# Patient Record
Sex: MALE | Race: BLACK OR AFRICAN AMERICAN | Employment: OTHER | URBAN - METROPOLITAN AREA
[De-identification: names, ages, dates, MRNs, and addresses within clinical notes are randomized per-mention and may not be internally consistent; named-entity substitution may affect disease eponyms.]

---

## 2017-08-21 ENCOUNTER — HOSPITAL ENCOUNTER (OUTPATIENT)
Age: 82
Setting detail: OUTPATIENT SURGERY
Discharge: HOME | End: 2017-08-21
Attending: INTERNAL MEDICINE | Admitting: INTERNAL MEDICINE
Payer: MEDICARE

## 2017-08-21 ENCOUNTER — ANESTHESIA EVENT (OUTPATIENT)
Dept: ENDOSCOPY | Age: 82
End: 2017-08-21
Payer: MEDICARE

## 2017-08-21 ENCOUNTER — ANESTHESIA (OUTPATIENT)
Dept: ENDOSCOPY | Age: 82
End: 2017-08-21
Payer: MEDICARE

## 2017-08-21 VITALS
HEART RATE: 61 BPM | RESPIRATION RATE: 17 BRPM | OXYGEN SATURATION: 100 % | DIASTOLIC BLOOD PRESSURE: 75 MMHG | WEIGHT: 252 LBS | BODY MASS INDEX: 33.4 KG/M2 | SYSTOLIC BLOOD PRESSURE: 160 MMHG | HEIGHT: 73 IN

## 2017-08-21 LAB
GLUCOSE BLD STRIP.AUTO-MCNC: 106 MG/DL (ref 65–100)
GLUCOSE BLD STRIP.AUTO-MCNC: 59 MG/DL (ref 65–100)
GLUCOSE BLD STRIP.AUTO-MCNC: 71 MG/DL (ref 65–100)
GLUCOSE BLD STRIP.AUTO-MCNC: 73 MG/DL (ref 65–100)
GLUCOSE BLD STRIP.AUTO-MCNC: 87 MG/DL (ref 65–100)
SERVICE CMNT-IMP: ABNORMAL
SERVICE CMNT-IMP: ABNORMAL
SERVICE CMNT-IMP: NORMAL

## 2017-08-21 PROCEDURE — 76040000007: Performed by: INTERNAL MEDICINE

## 2017-08-21 PROCEDURE — 82962 GLUCOSE BLOOD TEST: CPT

## 2017-08-21 PROCEDURE — 74011250636 HC RX REV CODE- 250/636: Performed by: INTERNAL MEDICINE

## 2017-08-21 PROCEDURE — 88305 TISSUE EXAM BY PATHOLOGIST: CPT | Performed by: INTERNAL MEDICINE

## 2017-08-21 PROCEDURE — 74011250636 HC RX REV CODE- 250/636

## 2017-08-21 PROCEDURE — 77030009426 HC FCPS BIOP ENDOSC BSC -B: Performed by: INTERNAL MEDICINE

## 2017-08-21 PROCEDURE — 74011000250 HC RX REV CODE- 250: Performed by: INTERNAL MEDICINE

## 2017-08-21 PROCEDURE — 77030011640 HC PAD GRND REM COVD -A: Performed by: INTERNAL MEDICINE

## 2017-08-21 PROCEDURE — 76060000032 HC ANESTHESIA 0.5 TO 1 HR: Performed by: INTERNAL MEDICINE

## 2017-08-21 PROCEDURE — 77030003657 HC NDL SCLER BSC -B: Performed by: INTERNAL MEDICINE

## 2017-08-21 PROCEDURE — 77030013992 HC SNR POLYP ENDOSC BSC -B: Performed by: INTERNAL MEDICINE

## 2017-08-21 PROCEDURE — 74011000250 HC RX REV CODE- 250

## 2017-08-21 RX ORDER — DEXTROSE 50 % IN WATER (D50W) INTRAVENOUS SYRINGE
10 ONCE
Status: DISCONTINUED | OUTPATIENT
Start: 2017-08-21 | End: 2017-08-21 | Stop reason: HOSPADM

## 2017-08-21 RX ORDER — FLUMAZENIL 0.1 MG/ML
0.2 INJECTION INTRAVENOUS
Status: DISCONTINUED | OUTPATIENT
Start: 2017-08-21 | End: 2017-08-21 | Stop reason: HOSPADM

## 2017-08-21 RX ORDER — ATROPINE SULFATE 0.1 MG/ML
0.5 INJECTION INTRAVENOUS
Status: DISCONTINUED | OUTPATIENT
Start: 2017-08-21 | End: 2017-08-21 | Stop reason: HOSPADM

## 2017-08-21 RX ORDER — SODIUM CHLORIDE 9 MG/ML
50 INJECTION, SOLUTION INTRAVENOUS CONTINUOUS
Status: DISCONTINUED | OUTPATIENT
Start: 2017-08-21 | End: 2017-08-21 | Stop reason: HOSPADM

## 2017-08-21 RX ORDER — NALOXONE HYDROCHLORIDE 0.4 MG/ML
0.4 INJECTION, SOLUTION INTRAMUSCULAR; INTRAVENOUS; SUBCUTANEOUS
Status: DISCONTINUED | OUTPATIENT
Start: 2017-08-21 | End: 2017-08-21 | Stop reason: HOSPADM

## 2017-08-21 RX ORDER — FENTANYL CITRATE 50 UG/ML
100 INJECTION, SOLUTION INTRAMUSCULAR; INTRAVENOUS
Status: DISCONTINUED | OUTPATIENT
Start: 2017-08-21 | End: 2017-08-21 | Stop reason: HOSPADM

## 2017-08-21 RX ORDER — PROPOFOL 10 MG/ML
INJECTION, EMULSION INTRAVENOUS
Status: DISCONTINUED | OUTPATIENT
Start: 2017-08-21 | End: 2017-08-21 | Stop reason: HOSPADM

## 2017-08-21 RX ORDER — DEXTROSE 50 % IN WATER (D50W) INTRAVENOUS SYRINGE
10 AS NEEDED
Status: DISCONTINUED | OUTPATIENT
Start: 2017-08-21 | End: 2017-08-21 | Stop reason: HOSPADM

## 2017-08-21 RX ORDER — DEXTROMETHORPHAN/PSEUDOEPHED 2.5-7.5/.8
1.2 DROPS ORAL
Status: DISCONTINUED | OUTPATIENT
Start: 2017-08-21 | End: 2017-08-21 | Stop reason: HOSPADM

## 2017-08-21 RX ORDER — EPINEPHRINE 0.1 MG/ML
1 INJECTION INTRACARDIAC; INTRAVENOUS
Status: DISCONTINUED | OUTPATIENT
Start: 2017-08-21 | End: 2017-08-21 | Stop reason: HOSPADM

## 2017-08-21 RX ORDER — MIDAZOLAM HYDROCHLORIDE 1 MG/ML
.25-5 INJECTION, SOLUTION INTRAMUSCULAR; INTRAVENOUS
Status: DISCONTINUED | OUTPATIENT
Start: 2017-08-21 | End: 2017-08-21 | Stop reason: HOSPADM

## 2017-08-21 RX ORDER — PROPOFOL 10 MG/ML
INJECTION, EMULSION INTRAVENOUS AS NEEDED
Status: DISCONTINUED | OUTPATIENT
Start: 2017-08-21 | End: 2017-08-21 | Stop reason: HOSPADM

## 2017-08-21 RX ORDER — DEXTROSE 50 % IN WATER (D50W) INTRAVENOUS SYRINGE
Status: COMPLETED
Start: 2017-08-21 | End: 2017-08-21

## 2017-08-21 RX ORDER — GLUCOSAMINE SULFATE 1500 MG
POWDER IN PACKET (EA) ORAL DAILY
COMMUNITY

## 2017-08-21 RX ADMIN — DEXTROSE MONOHYDRATE 25 G: 25 INJECTION, SOLUTION INTRAVENOUS at 09:05

## 2017-08-21 RX ADMIN — PROPOFOL 125 MCG/KG/MIN: 10 INJECTION, EMULSION INTRAVENOUS at 09:36

## 2017-08-21 RX ADMIN — DEXTROSE MONOHYDRATE: 25 INJECTION, SOLUTION INTRAVENOUS at 09:20

## 2017-08-21 RX ADMIN — SODIUM CHLORIDE 50 ML/HR: 900 INJECTION, SOLUTION INTRAVENOUS at 09:00

## 2017-08-21 RX ADMIN — PROPOFOL 70 MG: 10 INJECTION, EMULSION INTRAVENOUS at 09:36

## 2017-08-21 NOTE — PROGRESS NOTES
Garret Shepard  1935  [id number]    Situation:  Verbal report received from: NELLIE Seo rn  Procedure: Procedure(s):  COLONOSCOPY  ENDOSCOPIC POLYPECTOMY  INJECTION    Background:    Preoperative diagnosis: SCREENING  Postoperative diagnosis: Polyp removal    :  Dr. Lennox Catalan  Assistant(s): Endoscopy Technician-1: Yadira Rosario RN-1: Mora Bartholomew RN    Specimens:   ID Type Source Tests Collected by Time Destination   1 : polyp Preservative Colon, Ascending  Jose Mcdermott MD 8/21/2017 0286 Pathology   2 : polyp  Preservative   Jose Mcdermott MD 8/21/2017 1013 Pathology     H. Pylori  no    Assessment:  Intra-procedure medications   Anesthesia gave intra-procedure sedation and medications, see anesthesia flow sheet yes    Intravenous fluids: NS@ KVO     Vital signs stable     Abdominal assessment: round and soft     Recommendation:  Discharge patient per MD order. Family  Permission to share finding with family or friend yes  Endoscopy discharge instructions have been reviewed and given to patient. The patient verbalized understanding and acceptance of instructions.

## 2017-08-21 NOTE — PROCEDURES
[address] MD Rafy  [phone]      2017    Colonoscopy Procedure Note  Tran Waldrop  :  1935  [facility] Medical Record Number: [id number]    Indications:     Screening colonoscopy  PCP:  Erlin Toledo MD  Anesthesia/Sedation: Conscious Sedation/Moderate Sedation  Endoscopist:  Dr. Cricket Martinez  Complications:  None  Estimate Blood Loss:  None    Permit:  The indications, risks, benefits and alternatives were reviewed with the patient or their decision maker who was provided an opportunity to ask questions and all questions were answered. The specific risks of colonoscopy with conscious sedation were reviewed, including but not limited to anesthetic complication, bleeding, adverse drug reaction, missed lesion, infection, IV site reactions, and intestinal perforation which would lead to the need for surgical repair. Alternatives to colonoscopy including radiographic imaging, observation without testing, or laboratory testing were reviewed including the limitations of those alternatives. After considering the options and having all their questions answered, the patient or their decision maker provided both verbal and written consent to proceed. Procedure in Detail:  After obtaining informed consent, positioning of the patient in the left lateral decubitus position, and conduction of a pre-procedure pause or \"time out\" the endoscope was introduced into the anus and advanced to the surgical anastomosis which is distal ascending colon to hepatic flexure. The quality of the colonic preparation was inadequate to exclude polyps. A careful inspection was made as the colonoscope was withdrawn, findings and interventions are described below.     Anastomosis photographed    Findings:   Immediately distal to anastomosis is 30 mm sessile polyp  Two additional polyps in mid transverse  Unable for reasonable evaluation rectum, sigmoid, descending because of preparation limitations    Specimens:    30 mm polyp removed caustery snare after 60 cc saline injection for saline pillow; 10 cc [place] ink also injected   Two mid transverse polyps removed cold snare    Complications:   None; patient tolerated the procedure well. Estimated blood loss: none    Impression:  colon polyps    Recommendations:      - if no cancer present follow up exam six months    Thank you for entrusting me with this patient's care. Please do not hesitate to contact me with any questions or if I can be of assistance with any of your other patients' GI needs.     Signed By: Piper Cobian MD                        August 21, 2017

## 2017-08-21 NOTE — PROGRESS NOTES
1103  Blood sugar retested 80  Dr Conrad Severs anestheologist notified of result  Approved for discharge pt given and eating zuri crackers and peanut butter and cranberry juice  Pt alert and oriented  Pleasant and cooperative  No distress noted

## 2017-08-21 NOTE — PERIOP NOTES
Pt resting comfortably on stretcher HOB up A&O x3 wife at bedside, BS 61 Dr Venkat Elmore notified will continue to monitor pt.

## 2017-08-21 NOTE — H&P
Gastroenterology Outpatient History and Physical    Patient: Sánchez Amaya    Physician: Ronald Linares MD    Vital Signs: See RN notes    Allergies: No Known Allergies    Chief Complaint: follow up    History of Present Illness: 2014 had five colon polyps including sessile 25 mm lesion. No chest pain, SOB, edema, focal weakness, numbness    Justification for Procedure: mulitple large polyps    History:  Past Medical History:   Diagnosis Date    Diabetes ([address]     Hypertension       Past Surgical History:   Procedure Laterality Date    ABDOMEN SURGERY PROC UNLISTED      partial colectomy (Dr. Galen Carvalho)      Social History     Social History    Marital status:      Spouse name: N/A    Number of children: N/A    Years of education: N/A     Social History Main Topics    Smoking status: Not on file    Smokeless tobacco: Not on file    Alcohol use No    Drug use: Not on file    Sexual activity: Not on file     Other Topics Concern    Not on file     Social History Narrative    No narrative on file      Family History   Problem Relation Age of Onset    Malignant Hyperthermia Neg Hx     Pseudocholinesterase Deficiency Neg Hx     Delayed Awakening Neg Hx     Post-op Nausea/Vomiting Neg Hx     Emergence Delirium Neg Hx     Post-op Cognitive Dysfunction Neg Hx     Other Neg Hx        Medications:   Prior to Admission medications    Medication Sig Start Date End Date Taking? Authorizing Provider   aspirin delayed-release 81 mg tablet Take 81 mg by mouth daily. Historical Provider   metFORMIN (GLUCOPHAGE) 500 mg tablet Take 500 mg by mouth two (2) times daily (with meals). Historical Provider   atenolol (TENORMIN) 100 mg tablet Take 100 mg by mouth daily. Historical Provider   losartan-hydrochlorothiazide (HYZAAR) 100-12.5 mg per tablet Take 1 Tab by mouth daily. Historical Provider   furosemide (LASIX) 20 mg tablet Take 40 mg by mouth daily.     Historical Provider   simvastatin (ZOCOR) 20 mg tablet Take 10 mg by mouth nightly. Historical Provider   insulin (HUMULIN 70/30) 100 unit/mL (70-30) injection by SubCUTAneous route once. Indications: TYPE 1 DIABETES MELLITUS    Historical Provider   timolol maleate 0.5 % DrpD ophthalmic solution Administer 1 Drop to both eyes daily. Historical Provider       Physical Exam:   General: alert, cooperative, no distress   HEENT: Head: Normocephalic, no lesions, without obvious abnormality. Heart: regular rate and rhythm   Lungs: chest clear, no wheezing, rales, normal symmetric air entry   Abdominal: Bowel sounds are normal, liver is not enlarged, spleen is not enlarged           Findings/Diagnosis: personal history cancer, polyps    Plan of Care/Planned Procedure: I've discussed colonoscopy possible biopsy, polypectomy, cautery, injection, alternatives, complications including but not limited to pain, cardiopulmonary event, bleeding, perforation requiring additional blood transfusion or operative repair; all questions answered.     Signed By: Yissel Guerra MD     August 21, 2017

## 2017-08-21 NOTE — IP AVS SNAPSHOT
[address] 
[phone] Patient: Laureano Christian MRN: [id number] :1935 You are allergic to the following No active allergies Recent Documentation Height Weight BMI Smoking Status 1.854 m 114.3 kg 33.25 kg/m2 Former Smoker Emergency Contacts Name Discharge Info Relation Home Work Mobile FaniShyamAdelita DISCHARGE CAREGIVER [3] Spouse [3] [phone] About your hospitalization You were admitted on:  2017 You last received care in the:  [facility] ENDOSCOPY You were discharged on:  2017 Unit phone number:  [phone] Why you were hospitalized Your primary diagnosis was:  Not on File Providers Seen During Your Hospitalizations Provider Role Specialty Primary office phone Shahbaz Restrepo MD Attending Provider Gastroenterology [phone] Your Primary Care Physician (PCP) Primary Care Physician Office Phone Office Fax [place] [phone] Follow-up Information Follow up With Details Comments Contact Info Jacky Vela MD   [facility] [place] [address] 
[phone] Current Discharge Medication List  
  
CONTINUE these medications which have NOT CHANGED Dose & Instructions Dispensing Information Comments Morning Noon Evening Bedtime  
 aspirin delayed-release 81 mg tablet Your last dose was: Your next dose is:    
   
   
 Dose:  81 mg Take 81 mg by mouth daily. Refills:  0  
     
   
   
   
  
 atenolol 100 mg tablet Commonly known as:  TENORMIN Your last dose was: Your next dose is:    
   
   
 Dose:  100 mg Take 100 mg by mouth daily. Refills:  0 HumuLIN 70/30 100 unit/mL (70-30) injection Generic drug:  insulin NPH/insulin regular Your last dose was: Your next dose is:    
   
   
 by SubCUTAneous route once. Indications: TYPE 1 DIABETES MELLITUS Refills:  0  
     
   
   
   
  
 losartan-hydroCHLOROthiazide 100-12.5 mg per tablet Commonly known as:  HYZAAR Your last dose was: Your next dose is:    
   
   
 Dose:  1 Tab Take 1 Tab by mouth daily. Refills:  0  
     
   
   
   
  
 metFORMIN 500 mg tablet Commonly known as:  GLUCOPHAGE Your last dose was: Your next dose is:    
   
   
 Dose:  500 mg Take 500 mg by mouth two (2) times daily (with meals). Refills:  0  
     
   
   
   
  
 timolol maleate 0.5 % Drpd ophthalmic solution Your last dose was: Your next dose is:    
   
   
 Dose:  1 Drop Administer 1 Drop to both eyes daily. Refills:  0  
     
   
   
   
  
 VITAMIN D3 1,000 unit Cap Generic drug:  cholecalciferol Your last dose was: Your next dose is: Take  by mouth daily. Refills:  0 Discharge Instructions [place] [id number] 1935 DISCHARGE INSTRUCTIONS Discomfort: 
Redness at IV site- apply warm compress to area; if redness or soreness persist- contact your physician. There may be a slight amount of blood passed from the rectum. Gaseous discomfort - walking, belching will help relieve any discomfort. You may not operate a vehicle for 12 hours. You may not engage in an occupation involving machinery or appliances for rest of today. You may not drink alcoholic beverages for at least 12 hours. Avoid making any critical decisions for at least 24 hours. DIET: 
 High fiber diet.  however -  remember your colon is empty and a heavy meal will produce gas. Avoid these foods:  vegetables, fried / greasy foods, carbonated drinks for today.  
 
  
ACTIVITY: 
You may resume your normal daily activities it is recommended that you spend the remainder of the day resting -  avoid any strenuous activity. CALL M.D. ANY SIGN OF: Increasing pain, nausea, vomiting Abdominal distension (swelling) New increased bleeding (oral or rectal) Fever (chills) Pain in chest area Bloody discharge from nose or mouth Shortness of breath Follow-up Instructions: 
Call Dr. Hussein Leone in six months DISCHARGE SUMMARY from Nurse The following personal items collected during your admission are returned to you:  
Dental Appliance: Dental Appliances: None Vision: Visual Aid: Glasses Hearing Aid:   
Jewelry:   
Clothing:   
Other Valuables:   
Valuables sent to safe:   
 
 
Discharge Orders None Introducing [facility]! Jin Blanco introduces [employer] patient portal. Now you can access parts of your medical record, email your doctor's office, and request medication refills online. 1. In your internet browser, go to [email or web address] 2. Click on the First Time User? Click Here link in the Sign In box. You will see the New Member Sign Up page. 3. Enter your [employer] Access Code exactly as it appears below. You will not need to use this code after youve completed the sign-up process. If you do not sign up before the expiration date, you must request a new code. · [employer] Access Code: [email or web address] Expires: 11/19/2017  8:00 AM 
 
4. Enter the last four digits of your Social Security Number (xxxx) and Date of Birth (mm/dd/yyyy) as indicated and click Submit. You will be taken to the next sign-up page. 5. Create a [employer] ID. This will be your [employer] login ID and cannot be changed, so think of one that is secure and easy to remember. 6. Create a [employer] password. You can change your password at any time. 7. Enter your Password Reset Question and Answer. This can be used at a later time if you forget your password. 8. Enter your e-mail address.  You will receive e-mail notification when new information is available in [employer]. 9. Click Sign Up. You can now view and download portions of your medical record. 10. Click the Download Summary menu link to download a portable copy of your medical information. If you have questions, please visit the Frequently Asked Questions section of the [employer] website. Remember, [employer] is NOT to be used for urgent needs. For medical emergencies, dial 911. Now available from your iPhone and Android! General Information Please provide this summary of care documentation to your next provider. Patient Signature:  ____________________________________________________________ Date:  ____________________________________________________________  
  
[facility] Provider Signature:  ____________________________________________________________ Date:  ____________________________________________________________

## 2017-08-21 NOTE — DISCHARGE INSTRUCTIONS
Nadira Mook  [id number]  1935    DISCHARGE INSTRUCTIONS  Discomfort:  Redness at IV site- apply warm compress to area; if redness or soreness persist- contact your physician. There may be a slight amount of blood passed from the rectum. Gaseous discomfort - walking, belching will help relieve any discomfort. You may not operate a vehicle for 12 hours. You may not engage in an occupation involving machinery or appliances for rest of today. You may not drink alcoholic beverages for at least 12 hours. Avoid making any critical decisions for at least 24 hours. DIET:   High fiber diet. - however -  remember your colon is empty and a heavy meal will produce gas. Avoid these foods:  vegetables, fried / greasy foods, carbonated drinks for today. ACTIVITY:  You may resume your normal daily activities it is recommended that you spend the remainder of the day resting -  avoid any strenuous activity. CALL M.D.   ANY SIGN OF:   Increasing pain, nausea, vomiting  Abdominal distension (swelling)  New increased bleeding (oral or rectal)  Fever (chills)  Pain in chest area  Bloody discharge from nose or mouth  Shortness of breath     Follow-up Instructions:  Call Dr. Hussein Leone in six months      DISCHARGE SUMMARY from Nurse    The following personal items collected during your admission are returned to you:   Dental Appliance: Dental Appliances: None  Vision: Visual Aid: Glasses  Hearing Aid:    Jewelry:    Clothing:    Other Valuables:    Valuables sent to safe:

## 2017-08-21 NOTE — PERIOP NOTES
Pt BS 73 at this time, Dr Andrew Franklin at bedside completed colonoscopy/procedure will re-check BS in recovery.

## 2017-08-21 NOTE — ANESTHESIA POSTPROCEDURE EVALUATION
Post-Anesthesia Evaluation and Assessment    Patient: Dave Osborne MRN: [id number]  SSN: [id number]    YOB: 1935  Age: 81 y.o. Sex: male       Cardiovascular Function/Vital Signs  Visit Vitals    /71    Pulse 64    Resp 18    Ht 6' 1\" (1.854 m)    Wt 114.3 kg (252 lb)    SpO2 100%    BMI 33.25 kg/m2       Patient is status post MAC anesthesia for Procedure(s):  COLONOSCOPY  ENDOSCOPIC POLYPECTOMY  INJECTION. Nausea/Vomiting: None    Postoperative hydration reviewed and adequate. Pain:  Pain Scale 1: Numeric (0 - 10) (08/21/17 1034)  Pain Intensity 1: 0 (08/21/17 1034)   Managed    Neurological Status: At baseline    Mental Status and Level of Consciousness: Arousable    Pulmonary Status:   O2 Device: Room air (08/21/17 1034)   Adequate oxygenation and airway patent    Complications related to anesthesia: None    Post-anesthesia assessment completed.  No concerns    Signed By: Raquel Everett MD     August 21, 2017

## 2017-08-21 NOTE — ANESTHESIA PREPROCEDURE EVALUATION
Anesthetic History   No history of anesthetic complications            Review of Systems / Medical History  Patient summary reviewed    Pulmonary  Within defined limits                 Neuro/Psych   Within defined limits           Cardiovascular    Hypertension: well controlled              Exercise tolerance: >4 METS     GI/Hepatic/Renal  Within defined limits              Endo/Other    Diabetes: well controlled, type 2, using insulin    Obesity     Other Findings              Physical Exam    Airway  Mallampati: II  TM Distance: 4 - 6 cm  Neck ROM: normal range of motion   Mouth opening: Normal     Cardiovascular    Rhythm: regular  Rate: normal         Dental    Dentition: Full lower dentures and Full upper dentures     Pulmonary  Breath sounds clear to auscultation               Abdominal         Other Findings            Anesthetic Plan    ASA: 3  Anesthesia type: MAC            Anesthetic plan and risks discussed with: Patient

## 2017-08-23 LAB
GLUCOSE BLD STRIP.AUTO-MCNC: NORMAL MG/DL (ref 65–100)
SERVICE CMNT-IMP: NORMAL

## 2017-12-27 ENCOUNTER — ED HISTORICAL/CONVERTED ENCOUNTER (OUTPATIENT)
Dept: OTHER | Age: 82
End: 2017-12-27

## 2019-10-05 ENCOUNTER — OP HISTORICAL/CONVERTED ENCOUNTER (OUTPATIENT)
Dept: OTHER | Age: 84
End: 2019-10-05

## 2019-10-13 ENCOUNTER — ED HISTORICAL/CONVERTED ENCOUNTER (OUTPATIENT)
Dept: OTHER | Age: 84
End: 2019-10-13

## 2022-03-25 ENCOUNTER — HOSPITAL ENCOUNTER (EMERGENCY)
Age: 87
Discharge: HOME | End: 2022-03-25
Attending: EMERGENCY MEDICINE | Admitting: EMERGENCY MEDICINE
Payer: MEDICARE

## 2022-03-25 VITALS
DIASTOLIC BLOOD PRESSURE: 85 MMHG | WEIGHT: 245 LBS | SYSTOLIC BLOOD PRESSURE: 172 MMHG | RESPIRATION RATE: 18 BRPM | HEIGHT: 73 IN | TEMPERATURE: 99.5 F | OXYGEN SATURATION: 99 % | BODY MASS INDEX: 32.47 KG/M2 | HEART RATE: 114 BPM

## 2022-03-25 DIAGNOSIS — K40.90 UNILATERAL INGUINAL HERNIA WITHOUT OBSTRUCTION OR GANGRENE, RECURRENCE NOT SPECIFIED: Primary | ICD-10-CM

## 2022-03-25 PROCEDURE — 99282 EMERGENCY DEPT VISIT SF MDM: CPT

## 2022-03-25 NOTE — ED PROVIDER NOTES
Understanding Generalized Anxiety Disorder (ROSE MARY)  Anxiety can fill you with worry and fear. Sometimes anxiety is healthy. It alerts you to a potential threat and prompts you to respond and take action. But, for some people, anxiety gets so bad it causes problems in daily life. If you find yourself in a constant state of anxiety, you may have an anxiety disorder called generalized anxiety disorder (ROSE MARY). Speak with your doctor or mental health professional to learn more. He or she can help.     What is generalized anxiety disorder?  With ROSE MARY, you might worry about money, your family and friends, work, or the world in general. You might not even be sure what you're anxious about. Whatever it is, though, you have an intense fear that the worst will happen. These feelings never really go away. This constant worry affects your quality of life and makes it hard to function. ROSE MARY can cause physical symptoms, too.  What are common symptoms of generalized anxiety disorder?  People with ROSE MARY often think they have a physical illness. The disorder can cause symptoms, such as:    Muscle tension, especially in the neck and shoulders.    Nausea and stomach problems.    Frequent headaches.    Feeling lightheaded.    Restlessness, trouble sleeping.    Feeling irritable and on edge all the time.  How can generalized anxiety disorder be treated?  ROSE MARY can be treated with medicine or therapy, or both. Medicine helps to reduce symptoms, so you can continue with your daily routine. Therapy helps you understand the cause of your anxiety and learn how to manage it. Both forms of treatment help you deal with obstacles that anxiety causes in your life, so you can be healthier and happier.    7155-1597 [employer]. [address] [place] [address], [place] [address]. All rights reserved. This information is not intended as a substitute for professional medical care. Always follow your healthcare professional's instructions.         EMERGENCY DEPARTMENT HISTORY AND PHYSICAL EXAM      Date: 3/25/2022  Patient Name: Josesito De La Cruz    History of Presenting Illness     Chief Complaint   Patient presents with    Groin Pain    Back Pain       History Provided By: Patient    HPI: Josesito De La Cruz, 81 y.o. male with a past medical history significant No significant past medical history presents to the ED with cc of left testicular pain going on for the past 3 to 4 days. Patient says that he was lifting a heavy gas can and felt a strain in his lower groin. He denies any dysuria urgency or frequency. Says the pain is only made worse with walking. Patient denies any fever, chills, nausea, vomiting, chest pain, shortness of breath, rash, diarrhea, headache, night sweats. There are no other complaints, changes, or physical findings at this time. PCP: Rafita Sy MD    No current facility-administered medications on file prior to encounter. Current Outpatient Medications on File Prior to Encounter   Medication Sig Dispense Refill    cholecalciferol (VITAMIN D3) 1,000 unit cap Take  by mouth daily.  aspirin delayed-release 81 mg tablet Take 81 mg by mouth daily.  metFORMIN (GLUCOPHAGE) 500 mg tablet Take 500 mg by mouth two (2) times daily (with meals).  atenolol (TENORMIN) 100 mg tablet Take 100 mg by mouth daily.  losartan-hydrochlorothiazide (HYZAAR) 100-12.5 mg per tablet Take 1 Tab by mouth daily.  insulin (HUMULIN 70/30) 100 unit/mL (70-30) injection by SubCUTAneous route once. Indications: TYPE 1 DIABETES MELLITUS      timolol maleate 0.5 % DrpD ophthalmic solution Administer 1 Drop to both eyes daily.            Past History     Past Medical History:  Past Medical History:   Diagnosis Date    Diabetes ([address]     Hypertension        Past Surgical History:  Past Surgical History:   Procedure Laterality Date    COLONOSCOPY N/A 8/21/2017    COLONOSCOPY performed by Kalina Quick MD at [address] [place] [address]  CO ABDOMEN SURGERY PROC UNLISTED      partial colectomy (Dr. Stu Walker)       Family History:  Family History   Problem Relation Age of Onset    Malignant Hyperthermia Neg Hx     Pseudocholinesterase Deficiency Neg Hx     Delayed Awakening Neg Hx     Post-op Nausea/Vomiting Neg Hx     Emergence Delirium Neg Hx     Post-op Cognitive Dysfunction Neg Hx     Other Neg Hx        Social History:  Social History     Tobacco Use    Smoking status: Former Smoker    Smokeless tobacco: Never Used   Substance Use Topics    Alcohol use: No    Drug use: No       Allergies:  No Known Allergies        Review of Systems   Constitutional: Negative. Negative for appetite change, chills, fatigue and fever. HENT: Negative. Negative for congestion and sinus pain. Eyes: Negative. Negative for pain and visual disturbance. Respiratory: Negative. Negative for chest tightness and shortness of breath. Cardiovascular: Negative. Negative for chest pain. Gastrointestinal: Negative. Negative for abdominal pain, diarrhea, nausea and vomiting. Genitourinary: Positive for testicular pain. Negative for difficulty urinating, dysuria and genital sores. No discharge   Musculoskeletal: Negative. Negative for arthralgias. Skin: Negative. Negative for rash. Neurological: Negative. Negative for weakness and headaches. Hematological: Negative. Psychiatric/Behavioral: Negative. Negative for agitation. The patient is not nervous/anxious. All other systems reviewed and are negative. Physical Exam     Physical Exam  Vitals and nursing note reviewed. Constitutional:       General: He is not in acute distress. Appearance: He is well-developed. HENT:      Head: Normocephalic and atraumatic. Nose: Nose normal.      Mouth/Throat:      Mouth: Mucous membranes are moist.      Pharynx: Oropharynx is clear. No oropharyngeal exudate. Eyes:      General:         Right eye: No discharge. Left eye: No discharge. Conjunctiva/sclera: Conjunctivae normal.      Pupils: Pupils are equal, round, and reactive to light. Cardiovascular:      Rate and Rhythm: Normal rate and regular rhythm. Chest Wall: PMI is not displaced. No thrill. Heart sounds: Normal heart sounds. No murmur heard. No friction rub. No gallop. Pulmonary:      Effort: Pulmonary effort is normal. No respiratory distress. Breath sounds: Normal breath sounds. No wheezing or rales. Chest:      Chest wall: No tenderness. Abdominal:      General: Bowel sounds are normal. There is no distension. Palpations: Abdomen is soft. There is no mass. Tenderness: There is no abdominal tenderness. There is no guarding or rebound. Genitourinary:     Comments: Reducible indirect left-sided inguinal hernia  Musculoskeletal:         General: Normal range of motion. Cervical back: Normal range of motion and neck supple. Lymphadenopathy:      Cervical: No cervical adenopathy. Skin:     General: Skin is warm and dry. Capillary Refill: Capillary refill takes less than 2 seconds. Findings: No erythema or rash. Neurological:      Mental Status: He is alert and oriented to person, place, and time. Cranial Nerves: No cranial nerve deficit. Coordination: Coordination normal.   Psychiatric:         Mood and Affect: Mood normal.         Behavior: Behavior normal.         Lab and Diagnostic Study Results     Labs -   No results found for this or any previous visit (from the past 12 hour(s)). Radiologic Studies -   @lastxrresult@  CT Results  (Last 48 hours)    None        CXR Results  (Last 48 hours)    None            Medical Decision Making   - I am the first provider for this patient. - I reviewed the vital signs, available nursing notes, past medical history, past surgical history, family history and social history. - Initial assessment performed.  The patients presenting problems have been discussed, and they are in agreement with the care plan formulated and outlined with them. I have encouraged them to ask questions as they arise throughout their visit. Vital Signs-Reviewed the patient's vital signs. Patient Vitals for the past 12 hrs:   Temp Pulse Resp BP SpO2   03/25/22 0920 99.5 °F (37.5 °C) (!) 114 18 (!) 172/85 99 %         ED Course:          Provider Notes (Medical Decision Making):   81-year-old male with unilateral indirect inguinal hernia that is reducible. Patient presents stating that happened after heavy lifting. We will have him follow-up with general surgery for operative consultation. MDM       Procedures   Medical Decision Makingedical Decision Making  Performed by: Dhiraj Arreola MD  PROCEDURES:  Procedures       Disposition   Disposition: Condition stable    Discharged    DISCHARGE PLAN:  1. Current Discharge Medication List      CONTINUE these medications which have NOT CHANGED    Details   cholecalciferol (VITAMIN D3) 1,000 unit cap Take  by mouth daily. aspirin delayed-release 81 mg tablet Take 81 mg by mouth daily. metFORMIN (GLUCOPHAGE) 500 mg tablet Take 500 mg by mouth two (2) times daily (with meals). atenolol (TENORMIN) 100 mg tablet Take 100 mg by mouth daily. losartan-hydrochlorothiazide (HYZAAR) 100-12.5 mg per tablet Take 1 Tab by mouth daily. insulin (HUMULIN 70/30) 100 unit/mL (70-30) injection by SubCUTAneous route once. Indications: TYPE 1 DIABETES MELLITUS      timolol maleate 0.5 % DrpD ophthalmic solution Administer 1 Drop to both eyes daily. 2.   Follow-up Information     Follow up With Specialties Details Why Contact Info    Joao Banks MD Colon and Rectal Surgery, General Surgery Call today  [phone] [employer]  [phone]          3. Return to ED if worse   4. Current Discharge Medication List            Diagnosis     Clinical Impression:   1.  Unilateral inguinal hernia without obstruction or gangrene, recurrence not specified        Attestations:    Dalia London MD    Please note that this dictation was completed with [employer], the computer voice recognition software. Quite often unanticipated grammatical, syntax, homophones, and other interpretive errors are inadvertently transcribed by the computer software. Please disregard these errors. Please excuse any errors that have escaped final proofreading. Thank you.

## 2022-03-25 NOTE — ED TRIAGE NOTES
Lower back and scrotum swelling for 2 days, after lifting more than 5lbs. Htn has not taken Rx for 2 day, and BG (300) elevated no Rx for 2 days.

## 2022-03-31 ENCOUNTER — OFFICE VISIT (OUTPATIENT)
Dept: SURGERY | Age: 87
End: 2022-03-31
Payer: MEDICARE

## 2022-03-31 VITALS
WEIGHT: 252.5 LBS | HEIGHT: 73 IN | BODY MASS INDEX: 33.46 KG/M2 | SYSTOLIC BLOOD PRESSURE: 168 MMHG | TEMPERATURE: 98.1 F | OXYGEN SATURATION: 96 % | RESPIRATION RATE: 20 BRPM | HEART RATE: 78 BPM | DIASTOLIC BLOOD PRESSURE: 88 MMHG

## 2022-03-31 DIAGNOSIS — K40.90 NON-RECURRENT UNILATERAL INGUINAL HERNIA WITHOUT OBSTRUCTION OR GANGRENE: Primary | ICD-10-CM

## 2022-03-31 PROCEDURE — G8427 DOCREV CUR MEDS BY ELIG CLIN: HCPCS | Performed by: SURGERY

## 2022-03-31 PROCEDURE — G8417 CALC BMI ABV UP PARAM F/U: HCPCS | Performed by: SURGERY

## 2022-03-31 PROCEDURE — 99204 OFFICE O/P NEW MOD 45 MIN: CPT | Performed by: SURGERY

## 2022-03-31 PROCEDURE — G8510 SCR DEP NEG, NO PLAN REQD: HCPCS | Performed by: SURGERY

## 2022-03-31 PROCEDURE — 1101F PT FALLS ASSESS-DOCD LE1/YR: CPT | Performed by: SURGERY

## 2022-03-31 PROCEDURE — G8536 NO DOC ELDER MAL SCRN: HCPCS | Performed by: SURGERY

## 2022-03-31 NOTE — PROGRESS NOTES
[address], [address] [facility], [address] [place] [address]  [phone]      Patient Name: Nadia Massey (25 y.o., male)    Patient Address: [address] [place] [id number]    PCP: Joselin Sheffield MD     Patient contact numbers:     Home Phone  Work Phone  Mobile [phone]       History of Present Illness  Patient is an 79-year-old female who presents to the office today for evaluation of left groin pain. He was evaluated in the emergency department a few days ago and was diagnosed with a reducible left indirect inguinal hernia and referred to my office for further evaluation. Patient states that the pain began a few days before his presentation in the emergency department and is described as an aching pain. He states that the pain is worse with movement including adduction of his lower extremities. He also states the pain is worse with ambulation and he is requiring a cane to assist with ambulation currently. He denies any difficulty with bowel movements or difficulty with voiding. He is tolerating a regular diet without nausea or vomiting. He denies any fevers or chills.     Past Medical History:   Diagnosis Date    Diabetes ([address]     Hypertension        Past Surgical History:   Procedure Laterality Date    COLONOSCOPY N/A 8/21/2017    COLONOSCOPY performed by Kelly Barrios MD at [address] [place] [address] UNLISTED      partial colectomy (Dr. Luis Carlos Mena)       Family History   Problem Relation Age of Onset    Malignant Hyperthermia Neg Hx     Pseudocholinesterase Deficiency Neg Hx     Delayed Awakening Neg Hx     Post-op Nausea/Vomiting Neg Hx     Emergence Delirium Neg Hx     Post-op Cognitive Dysfunction Neg Hx     Other Neg Hx        Social History     Tobacco Use    Smoking status: Former Smoker    Smokeless tobacco: Never Used   Substance Use Topics    Alcohol use: No    Drug use: No       No Known Allergies    Current Outpatient Medications   Medication Sig    metFORMIN (GLUCOPHAGE) 500 mg tablet Take 500 mg by mouth two (2) times daily (with meals).  atenolol (TENORMIN) 100 mg tablet Take 100 mg by mouth daily.  losartan-hydrochlorothiazide (HYZAAR) 100-12.5 mg per tablet Take 1 Tab by mouth daily.  insulin (HUMULIN 70/30) 100 unit/mL (70-30) injection by SubCUTAneous route once. Indications: TYPE 1 DIABETES MELLITUS    timolol maleate 0.5 % DrpD ophthalmic solution Administer 1 Drop to both eyes daily.  cholecalciferol (VITAMIN D3) 1,000 unit cap Take  by mouth daily.  aspirin delayed-release 81 mg tablet Take 81 mg by mouth daily. No current facility-administered medications for this visit. Review of Systems   Constitutional: Negative for chills, fever and weight loss. HENT: Negative for congestion, ear pain and sore throat. Eyes: Negative for blurred vision and redness. Respiratory: Negative for cough, shortness of breath and wheezing. Cardiovascular: Negative for chest pain, palpitations and leg swelling. Gastrointestinal: Negative for abdominal pain, nausea and vomiting. Genitourinary: Negative for dysuria, frequency and hematuria.        +groin pain, +scrotal/testicular pain   Musculoskeletal: Negative for joint pain and myalgias. Skin: Negative for itching and rash. Neurological: Negative for dizziness, seizures and headaches. Endo/Heme/Allergies: Does not bruise/bleed easily. Physical Exam  Visit Vitals  BP (!) 168/88 (BP 1 Location: Right upper arm, BP Patient Position: Sitting, BP Cuff Size: Large adult) Comment: pt jean has trouble keeping under control   Pulse 78   Temp 98.1 °F (36.7 °C) (Temporal)   Resp 20   Ht 6' 1\" (1.854 m)   Wt 252 lb 8 oz (114.5 kg)   SpO2 96%   BMI 33.31 kg/m²       Physical Exam  Constitutional:       General: He is not in acute distress. Appearance: Normal appearance. He is not ill-appearing.    HENT: Head: Normocephalic and atraumatic. Right Ear: External ear normal.      Left Ear: External ear normal.      Nose: Nose normal.      Mouth/Throat:      Mouth: Mucous membranes are moist.      Pharynx: Oropharynx is clear. Eyes:      Extraocular Movements: Extraocular movements intact. Pupils: Pupils are equal, round, and reactive to light. Cardiovascular:      Rate and Rhythm: Normal rate and regular rhythm. Pulses: Normal pulses. Heart sounds: Normal heart sounds. No murmur heard. Pulmonary:      Effort: Pulmonary effort is normal.      Breath sounds: Normal breath sounds. Abdominal:      General: There is no distension. Palpations: Abdomen is soft. Tenderness: There is no abdominal tenderness. There is no guarding. Hernia: A hernia is present. Hernia is present in the left inguinal area. Genitourinary:     Penis: Normal.       Testes:         Right: Tenderness or swelling not present. Left: Tenderness and swelling present. Comments: Left indirect inguinal hernia  Musculoskeletal:         General: No swelling or tenderness. Normal range of motion. Cervical back: Normal range of motion and neck supple. No rigidity or tenderness. Skin:     General: Skin is warm and dry. Neurological:      General: No focal deficit present. Mental Status: He is alert and oriented to person, place, and time. Psychiatric:         Mood and Affect: Mood normal.         Behavior: Behavior normal.          Assessment  Problem List Items Addressed This Visit     None      Visit Diagnoses     Non-recurrent unilateral inguinal hernia without obstruction or gangrene    -  Primary    Relevant Orders    CT ABD PELV WO CONT        Left indirect inguinal hernia, left hemiscrotum with mild swelling but very tender to palpation. Plan  - Recommend CT of the abdomen/pelvis to rule out other testicular process prior to proceeding with surgery.   - I did discuss the risks and benefits of minimally-invasive inguinal hernia repair, including bleeding, infection, damage to surrounding structures, need for further surgery, possible need to perform bilateral repair, etc.   - I will contact the patient after his CT scan to discuss results and possibly schedule for surgery. - All their questions were answered.       Kenney Felipe, DO

## 2022-03-31 NOTE — PROGRESS NOTES
Chief Complaint   Patient presents with    New Patient     pt says he has testicle swelling-very painful   There were no vitals taken for this visit. 1. Have you been to the ER, urgent care clinic since your last visit? Hospitalized since your last visit? yes    2. Have you seen or consulted any other health care providers outside of the [address] [place] [address] since your last visit? Include any pap smears or colon screening. No                                     First BP check 177/90,  Repeat bp check>168/88. Pt says his physicians changes his medication and has a hard time keeping it straight.

## 2022-04-04 ENCOUNTER — HOSPITAL ENCOUNTER (OUTPATIENT)
Dept: CT IMAGING | Age: 87
Discharge: HOME | End: 2022-04-04
Attending: SURGERY
Payer: MEDICARE

## 2022-04-04 DIAGNOSIS — K40.90 NON-RECURRENT UNILATERAL INGUINAL HERNIA WITHOUT OBSTRUCTION OR GANGRENE: ICD-10-CM

## 2022-04-04 PROCEDURE — 74176 CT ABD & PELVIS W/O CONTRAST: CPT

## 2022-04-06 DIAGNOSIS — N41.0 ACUTE PROSTATITIS: Primary | ICD-10-CM

## 2022-04-06 RX ORDER — CIPROFLOXACIN 500 MG/1
500 TABLET ORAL 2 TIMES DAILY
Qty: 20 TABLET | Refills: 0 | Status: SHIPPED | OUTPATIENT
Start: 2022-04-06 | End: 2022-04-16

## 2022-04-06 NOTE — PROGRESS NOTES
CT Results (most recent):  Results from Hospital Encounter encounter on 04/04/22    CT ABD PELV WO CONT    Narrative  CT ABDOMEN PELVIS    CLINICAL: Unilateral inguinal hernia without obstruction or gangrene. Not  specified as recurrent. COMPARISON:  None. .    TECHNIQUE: Axial imaging abdomen pelvis without IV contrast, multiplanar  reformatting. Oral contrast administered for intraluminal bowel opacification. Dose reduction: All CT scans at this facility are performed using dose reduction  optimization techniques as appropriate to a performed exam including the  following: Automated exposure control, adjustments of the mA and/or kV according  to patient's size, or use of iterative reconstruction technique. FINDINGS:  LUNG BASES: Clear. LIVER: Unremarkable for noncontrast technique. GALLBLADDER AND BILIARY TREE: No evident gallstones, gallbladder wall  thickening, or biliary ductal dilation. PANCREAS: Atrophic. 2.3 x 1.4 by 1.2 cm cyst pancreas tail. SPLEEN: Unremarkable for noncontrast technique. ADRENALS: Unremarkable. KIDNEYS AND URETERS: Symmetric renal size. No urinary stone or collecting system  dilation. BLADDER: Incompletely distended. Circumferential bladder wall thickening. REPRODUCTIVE ORGANS: Prostate enlarged 4.6 x 5.2 by approximately 3.5 cm, With  surrounding strandy inflammation. BOWEL: Oral contrast has achieved the sigmoid colon. The bowel is not dilated. Right ileocolic anastomotic suture line. LYMPH NODES:  No lymphadenopathy. PERITONEUM: No free air, ascites, walled off fluid collection. VESSELS: Abdominal aorta normal caliber. ABDOMINAL WALL: 5.0 cm ventral wall diastases at the umbilicus containing  nondilated bowel and noninflamed fat. No evident inguinal hernia. Anterior  superficial abdominal wall soft tissue thickening. BONES: Degenerative changes about the bony structures. In plate compressions at  Schmorl's nodes involving L3, L4, L5. Impression  1.   Ventral wall bulge at umbilicus contains nondilated bowel and noninflamed  fat. 2.  No inguinal hernia. 3.  Circumferential bladder wall thickening from cystitis and/or outlet  obstruction. 4.  Prostate enlargement and surrounding inflammation. Correlate clinically for  prostatitis. 5.  Pancreas tail 2.3 cm cyst. Recommended CT follow-up in 1-2 years. 6.  Nondilated postoperative bowel. 7.  Anterior inferior abdominal wall superficial soft tissue thickening. Correlate clinically for injection sites, scarring, or cellulitis. Discussed CT results with patient's daughter via phone. No inguinal hernia, ventral hernia does not seem symptomatic at this time nor does it require surgical intervention. Cipro x 10 days sent to patient's pharmacy for prostatitis and referral placed for Dr. Jarad Sellers, urology. All questions answered, they will contact my office if other needs should arise.

## 2022-06-04 NOTE — PROGRESS NOTES
HISTORY OF PRESENT ILLNESS  Radha Dahl is a 81 y.o. male id here with cc of acute prostatitis and groin pain, testicular pain. Denied fevers and chills flank pain nausea vomiting. AS of per PCP note He had  Worse pain with movement including adduction of his lower extremities. He had pain  with ambulation  He  Was treated with cipro. Today he has no complains and or pain  No evidence of a gram-negative yarely in his urine. Has not had gross or dysuria or urethral discharge    CT scan 4/2022  IMPRESSION  1. Ventral wall bulge at umbilicus contains nondilated bowel and noninflamed  fat. 2.  No inguinal hernia. 3.  Circumferential bladder wall thickening from cystitis and/or outlet  obstruction. 4.  Prostate enlargement and surrounding inflammation. Correlate clinically for  prostatitis. 5.  Pancreas tail 2.3 cm cyst. Recommended CT follow-up in 1-2 years. 6.  Nondilated postoperative bowel. 7.  Anterior inferior abdominal wall superficial soft tissue thickening. Correlate clinically for injection sites, scarring, or cellulitis. IPSS  Score: 10    Past Medical History:  PMHx (including negatives):  has a past medical history of Burning with urination, Diabetes ([facility] [address] and Hypertension. PSurgHx:  has a past surgical history that includes pr abdomen surgery proc unlisted and colonoscopy (N/A, 8/21/2017). PSocHx:  reports that he has quit smoking. He has never used smokeless tobacco. He reports that he does not drink alcohol and does not use drugs. Chronic Conditions Addressed Today     1. Type 2 diabetes mellitus without complication, with long-term current use of insulin ([facility])     Relevant Medications     losartan (COZAAR) 50 mg tablet    2. Prostatitis     Relevant Medications     losartan (COZAAR) 50 mg tablet    3.  Frequency of micturition - Primary     Relevant Orders     AMB POC PVR, RAMESH,POST-VOID RES,US,NON-IMAGING (Completed)     AMB POC URINALYSIS DIP STICK AUTO W/O MICRO (Completed) 4. Inguinal pain    5. Benign prostatic hyperplasia     Relevant Medications     losartan (COZAAR) 50 mg tablet    6. Umbilical hernia without obstruction and without gangrene          Review of Systems   Constitutional: Negative. HENT: Negative. Eyes: Negative. Respiratory: Negative. Cardiovascular: Negative. Gastrointestinal: Negative. Genitourinary: Positive for frequency. Skin: Negative. Neurological: Negative. Psychiatric/Behavioral: Negative. Patient denies the symptoms of COVID-19 per routine screening guidelines. Home Medications    Medication Sig Start Date End Date Taking? Authorizing Provider   netarsudiL (Rhopressa) 0.02 % drop 1 drop 8/23/21  Yes Provider, Historical   losartan (COZAAR) 50 mg tablet  5/31/22  Yes Provider, Historical   cholecalciferol (VITAMIN D3) 1,000 unit cap Take  by mouth daily. Yes Provider, Historical   aspirin delayed-release 81 mg tablet Take 81 mg by mouth daily. Yes Provider, Historical   metFORMIN (GLUCOPHAGE) 500 mg tablet Take 500 mg by mouth two (2) times daily (with meals). Yes Provider, Historical   atenolol (TENORMIN) 100 mg tablet Take 100 mg by mouth daily. Yes Provider, Historical   losartan-hydrochlorothiazide (HYZAAR) 100-12.5 mg per tablet Take 1 Tab by mouth daily. Yes Provider, Historical   insulin (HUMULIN 70/30) 100 unit/mL (70-30) injection by SubCUTAneous route once. Indications: TYPE 1 DIABETES MELLITUS   Yes Provider, Historical   timolol maleate 0.5 % DrpD ophthalmic solution Administer 1 Drop to both eyes daily. Yes Provider, Historical      Physical Exam  Vitals and nursing note reviewed. Constitutional:       Appearance: Normal appearance. He is obese. HENT:      Head: Normocephalic. Nose: Nose normal.   Eyes:      Extraocular Movements: Extraocular movements intact. Pupils: Pupils are equal, round, and reactive to light.    Cardiovascular:      Rate and Rhythm: Normal rate and regular rhythm. Pulmonary:      Effort: Pulmonary effort is normal.   Abdominal:      General: Abdomen is flat. Palpations: Abdomen is soft. Genitourinary:     Penis: Normal.       Testes: Normal.   Musculoskeletal:         General: Normal range of motion. Cervical back: Normal range of motion. Skin:     General: Skin is warm. Neurological:      General: No focal deficit present. Mental Status: He is alert and oriented to person, place, and time. Psychiatric:         Mood and Affect: Mood normal.         Behavior: Behavior normal.         Thought Content: Thought content normal.         Judgment: Judgment normal.         ASSESSMENT and PLAN      Has BPH most likely had an acute prostatitis. He is diabetic. No sign of yeast infection his urine is clear no pus his prostate does not appear to be an issue at this point though he does have BPH we will follow with you as needed        Libra Barriga may have a reminder for a \"due or due soon\" health maintenance. The patient has been encouraged to contact their primary care provider for follow-up on this health maintenance or other necessary and/or routine health screening.

## 2022-06-07 ENCOUNTER — OFFICE VISIT (OUTPATIENT)
Dept: UROLOGY | Age: 87
End: 2022-06-07
Payer: MEDICARE

## 2022-06-07 VITALS
BODY MASS INDEX: 34.46 KG/M2 | DIASTOLIC BLOOD PRESSURE: 88 MMHG | OXYGEN SATURATION: 99 % | HEIGHT: 73 IN | TEMPERATURE: 96.5 F | WEIGHT: 260 LBS | RESPIRATION RATE: 16 BRPM | HEART RATE: 88 BPM | SYSTOLIC BLOOD PRESSURE: 192 MMHG

## 2022-06-07 DIAGNOSIS — N41.9 PROSTATITIS, UNSPECIFIED PROSTATITIS TYPE: ICD-10-CM

## 2022-06-07 DIAGNOSIS — Z79.4 TYPE 2 DIABETES MELLITUS WITHOUT COMPLICATION, WITH LONG-TERM CURRENT USE OF INSULIN (HCC): ICD-10-CM

## 2022-06-07 DIAGNOSIS — N40.0 BENIGN PROSTATIC HYPERPLASIA, UNSPECIFIED WHETHER LOWER URINARY TRACT SYMPTOMS PRESENT: ICD-10-CM

## 2022-06-07 DIAGNOSIS — R35.0 FREQUENCY OF MICTURITION: Primary | ICD-10-CM

## 2022-06-07 DIAGNOSIS — E11.9 TYPE 2 DIABETES MELLITUS WITHOUT COMPLICATION, WITH LONG-TERM CURRENT USE OF INSULIN (HCC): ICD-10-CM

## 2022-06-07 DIAGNOSIS — K42.9 UMBILICAL HERNIA WITHOUT OBSTRUCTION AND WITHOUT GANGRENE: ICD-10-CM

## 2022-06-07 DIAGNOSIS — R10.30 INGUINAL PAIN, UNSPECIFIED LATERALITY: ICD-10-CM

## 2022-06-07 PROBLEM — E66.9 OBESITY: Status: ACTIVE | Noted: 2019-06-14

## 2022-06-07 PROBLEM — H35.379 EPIRETINAL MEMBRANE: Status: ACTIVE | Noted: 2022-06-07

## 2022-06-07 PROBLEM — L60.3 NAIL DYSTROPHY: Status: ACTIVE | Noted: 2019-06-14

## 2022-06-07 PROBLEM — E11.51 DIABETIC PERIPHERAL ANGIOPATHY (HCC): Status: ACTIVE | Noted: 2021-10-02

## 2022-06-07 PROBLEM — E11.42 DIABETIC POLYNEUROPATHY (HCC): Status: ACTIVE | Noted: 2019-06-14

## 2022-06-07 PROBLEM — I10 HYPERTENSION: Status: ACTIVE | Noted: 2019-06-14

## 2022-06-07 PROBLEM — E11.3513 TYPE 2 DIABETES MELLITUS WITH PROLIFERATIVE RETINOPATHY OF BOTH EYES AND MACULAR EDEMA (HCC): Status: ACTIVE | Noted: 2021-03-04

## 2022-06-07 PROBLEM — L85.1 ACQUIRED PLANTAR KERATODERMA: Status: ACTIVE | Noted: 2019-06-14

## 2022-06-07 LAB — PVR POC: NORMAL CC

## 2022-06-07 PROCEDURE — 99204 OFFICE O/P NEW MOD 45 MIN: CPT | Performed by: UROLOGY

## 2022-06-07 PROCEDURE — G8427 DOCREV CUR MEDS BY ELIG CLIN: HCPCS | Performed by: UROLOGY

## 2022-06-07 PROCEDURE — G8536 NO DOC ELDER MAL SCRN: HCPCS | Performed by: UROLOGY

## 2022-06-07 PROCEDURE — 1101F PT FALLS ASSESS-DOCD LE1/YR: CPT | Performed by: UROLOGY

## 2022-06-07 PROCEDURE — G8510 SCR DEP NEG, NO PLAN REQD: HCPCS | Performed by: UROLOGY

## 2022-06-07 PROCEDURE — 51798 US URINE CAPACITY MEASURE: CPT | Performed by: UROLOGY

## 2022-06-07 PROCEDURE — 81003 URINALYSIS AUTO W/O SCOPE: CPT | Performed by: UROLOGY

## 2022-06-07 PROCEDURE — G8417 CALC BMI ABV UP PARAM F/U: HCPCS | Performed by: UROLOGY

## 2022-06-07 PROCEDURE — 1123F ACP DISCUSS/DSCN MKR DOCD: CPT | Performed by: UROLOGY

## 2022-06-07 RX ORDER — NETARSUDIL 0.2 MG/ML
SOLUTION/ DROPS OPHTHALMIC; TOPICAL
COMMUNITY
Start: 2021-08-23

## 2022-06-07 RX ORDER — LOSARTAN POTASSIUM 50 MG/1
TABLET ORAL
COMMUNITY
Start: 2022-05-31

## 2022-06-07 NOTE — PROGRESS NOTES
Chief Complaint   Patient presents with    New Patient     ROS IPSS    Prostatitis     Ref Destinee, notes in chart OV 3/13/22   bladder scan       PHQ-9 score is    Negative    Vitals:    06/07/22 1032   BP: (!) 192/88   Pulse: 88   Resp: 16   Temp: (!) 96.5 °F (35.8 °C)   TempSrc: Temporal   SpO2: 99%   Weight: 260 lb (117.9 kg)   Height: 6' 1\" (1.854 m)      1. \"Have you been to the ER, urgent care clinic since your last visit? Hospitalized since your last visit? \" No    2. \"Have you seen or consulted any other health care providers outside of the [address] [place] [address] since your last visit? \" No     3. For patients aged 39-70: Has the patient had a colonoscopy / FIT/ Cologuard? Yes - no Care Gap present      If the patient is female:    4. For patients aged 41-77: Has the patient had a mammogram within the past 2 years? NA - based on age or sex      11. For patients aged 21-65: Has the patient had a pap smear?  NA - based on age or sex

## 2022-06-08 LAB
BILIRUB UR QL: NEGATIVE
GLUCOSE UR-MCNC: 250 MG/DL
KETONES P FAST UR STRIP-MCNC: NEGATIVE MG/DL
PH UR STRIP: 6 [PH] (ref 4.6–8)
PROT UR QL STRIP: NEGATIVE
SP GR UR STRIP: 1.02 (ref 1–1.03)
UA UROBILINOGEN AMB POC: NORMAL (ref 0.2–1)
URINALYSIS CLARITY POC: CLEAR
URINALYSIS COLOR POC: YELLOW
URINE BLOOD POC: NEGATIVE
URINE LEUKOCYTES POC: NEGATIVE
URINE NITRITES POC: NEGATIVE

## 2022-06-25 PROBLEM — Z79.4 TYPE 2 DIABETES MELLITUS WITHOUT COMPLICATION, WITH LONG-TERM CURRENT USE OF INSULIN (HCC): Status: ACTIVE | Noted: 2021-03-04

## 2022-06-25 PROBLEM — R10.30 INGUINAL PAIN: Status: ACTIVE | Noted: 2022-06-25

## 2022-06-25 PROBLEM — E11.9 TYPE 2 DIABETES MELLITUS WITHOUT COMPLICATION, WITH LONG-TERM CURRENT USE OF INSULIN (HCC): Status: ACTIVE | Noted: 2021-03-04

## 2022-06-25 PROBLEM — R35.0 FREQUENCY OF MICTURITION: Status: ACTIVE | Noted: 2022-06-25

## 2022-06-25 PROBLEM — N40.0 BENIGN PROSTATIC HYPERPLASIA: Status: ACTIVE | Noted: 2022-06-25

## 2022-06-25 PROBLEM — N41.9 PROSTATITIS: Status: ACTIVE | Noted: 2022-06-25

## 2022-06-25 PROBLEM — K42.9 UMBILICAL HERNIA WITHOUT OBSTRUCTION AND WITHOUT GANGRENE: Status: ACTIVE | Noted: 2022-06-25

## 2024-06-07 ENCOUNTER — APPOINTMENT (OUTPATIENT)
Facility: HOSPITAL | Age: 89
End: 2024-06-07
Payer: MEDICARE

## 2024-06-07 ENCOUNTER — HOSPITAL ENCOUNTER (INPATIENT)
Facility: HOSPITAL | Age: 89
LOS: 7 days | Discharge: HOME | End: 2024-06-14
Attending: STUDENT IN AN ORGANIZED HEALTH CARE EDUCATION/TRAINING PROGRAM | Admitting: FAMILY MEDICINE
Payer: MEDICARE

## 2024-06-07 DIAGNOSIS — L02.611 CELLULITIS AND ABSCESS OF TOE OF RIGHT FOOT: Primary | ICD-10-CM

## 2024-06-07 DIAGNOSIS — L03.031 CELLULITIS AND ABSCESS OF TOE OF RIGHT FOOT: Primary | ICD-10-CM

## 2024-06-07 DIAGNOSIS — L97.518 DIABETIC ULCER OF TOE OF RIGHT FOOT ASSOCIATED WITH TYPE 2 DIABETES MELLITUS, WITH OTHER ULCER SEVERITY (HCC): ICD-10-CM

## 2024-06-07 DIAGNOSIS — E11.621 DIABETIC ULCER OF TOE OF RIGHT FOOT ASSOCIATED WITH TYPE 2 DIABETES MELLITUS, WITH OTHER ULCER SEVERITY (HCC): ICD-10-CM

## 2024-06-07 LAB
ALBUMIN SERPL-MCNC: 3.3 G/DL (ref 3.5–5)
ALBUMIN/GLOB SERPL: 0.7 (ref 1.1–2.2)
ALP SERPL-CCNC: 85 U/L (ref 45–117)
ALT SERPL-CCNC: 17 U/L (ref 12–78)
ANION GAP SERPL CALC-SCNC: 5 MMOL/L (ref 5–15)
AST SERPL W P-5'-P-CCNC: 11 U/L (ref 15–37)
BASOPHILS # BLD: 0.1 K/UL (ref 0–0.1)
BASOPHILS NFR BLD: 1 % (ref 0–1)
BILIRUB SERPL-MCNC: 0.3 MG/DL (ref 0.2–1)
BUN SERPL-MCNC: 13 MG/DL (ref 6–20)
BUN/CREAT SERPL: 11 (ref 12–20)
CA-I BLD-MCNC: 9.3 MG/DL (ref 8.5–10.1)
CHLORIDE SERPL-SCNC: 103 MMOL/L (ref 97–108)
CO2 SERPL-SCNC: 29 MMOL/L (ref 21–32)
CREAT SERPL-MCNC: 1.2 MG/DL (ref 0.7–1.3)
DIFFERENTIAL METHOD BLD: ABNORMAL
EOSINOPHIL # BLD: 0.1 K/UL (ref 0–0.4)
EOSINOPHIL NFR BLD: 1 % (ref 0–7)
ERYTHROCYTE [DISTWIDTH] IN BLOOD BY AUTOMATED COUNT: 12.7 % (ref 11.5–14.5)
GLOBULIN SER CALC-MCNC: 4.8 G/DL (ref 2–4)
GLUCOSE SERPL-MCNC: 181 MG/DL (ref 65–100)
HCT VFR BLD AUTO: 37.1 % (ref 36.6–50.3)
HGB BLD-MCNC: 12.6 G/DL (ref 12.1–17)
IMM GRANULOCYTES # BLD AUTO: 0 K/UL (ref 0–0.04)
IMM GRANULOCYTES NFR BLD AUTO: 0 % (ref 0–0.5)
LACTATE SERPL-SCNC: 1.5 MMOL/L (ref 0.4–2)
LYMPHOCYTES # BLD: 2.3 K/UL (ref 0.8–3.5)
LYMPHOCYTES NFR BLD: 23 % (ref 12–49)
MCH RBC QN AUTO: 30.3 PG (ref 26–34)
MCHC RBC AUTO-ENTMCNC: 34 G/DL (ref 30–36.5)
MCV RBC AUTO: 89.2 FL (ref 80–99)
MONOCYTES # BLD: 1.1 K/UL (ref 0–1)
MONOCYTES NFR BLD: 11 % (ref 5–13)
NEUTS SEG # BLD: 6.4 K/UL (ref 1.8–8)
NEUTS SEG NFR BLD: 64 % (ref 32–75)
NRBC # BLD: 0 K/UL (ref 0–0.01)
NRBC BLD-RTO: 0 PER 100 WBC
PLATELET # BLD AUTO: 255 K/UL (ref 150–400)
PMV BLD AUTO: 10.5 FL (ref 8.9–12.9)
POTASSIUM SERPL-SCNC: 4 MMOL/L (ref 3.5–5.1)
PROT SERPL-MCNC: 8.1 G/DL (ref 6.4–8.2)
RBC # BLD AUTO: 4.16 M/UL (ref 4.1–5.7)
SODIUM SERPL-SCNC: 137 MMOL/L (ref 136–145)
WBC # BLD AUTO: 9.9 K/UL (ref 4.1–11.1)

## 2024-06-07 PROCEDURE — 36415 COLL VENOUS BLD VENIPUNCTURE: CPT

## 2024-06-07 PROCEDURE — 83605 ASSAY OF LACTIC ACID: CPT

## 2024-06-07 PROCEDURE — 87154 CUL TYP ID BLD PTHGN 6+ TRGT: CPT

## 2024-06-07 PROCEDURE — 1100000000 HC RM PRIVATE

## 2024-06-07 PROCEDURE — 73630 X-RAY EXAM OF FOOT: CPT

## 2024-06-07 PROCEDURE — 87077 CULTURE AEROBIC IDENTIFY: CPT

## 2024-06-07 PROCEDURE — 96365 THER/PROPH/DIAG IV INF INIT: CPT

## 2024-06-07 PROCEDURE — 2580000003 HC RX 258: Performed by: STUDENT IN AN ORGANIZED HEALTH CARE EDUCATION/TRAINING PROGRAM

## 2024-06-07 PROCEDURE — 85025 COMPLETE CBC W/AUTO DIFF WBC: CPT

## 2024-06-07 PROCEDURE — 99285 EMERGENCY DEPT VISIT HI MDM: CPT

## 2024-06-07 PROCEDURE — 80053 COMPREHEN METABOLIC PANEL: CPT

## 2024-06-07 PROCEDURE — 87040 BLOOD CULTURE FOR BACTERIA: CPT

## 2024-06-07 PROCEDURE — 96367 TX/PROPH/DG ADDL SEQ IV INF: CPT

## 2024-06-07 PROCEDURE — 96366 THER/PROPH/DIAG IV INF ADDON: CPT

## 2024-06-07 PROCEDURE — 6360000002 HC RX W HCPCS: Performed by: STUDENT IN AN ORGANIZED HEALTH CARE EDUCATION/TRAINING PROGRAM

## 2024-06-07 PROCEDURE — 87186 SC STD MICRODIL/AGAR DIL: CPT

## 2024-06-07 RX ORDER — INSULIN GLARGINE 100 [IU]/ML
20 INJECTION, SOLUTION SUBCUTANEOUS NIGHTLY
Status: DISCONTINUED | OUTPATIENT
Start: 2024-06-08 | End: 2024-06-14 | Stop reason: HOSPADM

## 2024-06-07 RX ADMIN — SODIUM CHLORIDE 2500 MG: 9 INJECTION, SOLUTION INTRAVENOUS at 20:28

## 2024-06-07 RX ADMIN — PIPERACILLIN AND TAZOBACTAM 4500 MG: 4; .5 INJECTION, POWDER, LYOPHILIZED, FOR SOLUTION INTRAVENOUS at 19:52

## 2024-06-07 ASSESSMENT — PAIN DESCRIPTION - LOCATION: LOCATION: FOOT

## 2024-06-07 ASSESSMENT — PAIN - FUNCTIONAL ASSESSMENT: PAIN_FUNCTIONAL_ASSESSMENT: NONE - DENIES PAIN

## 2024-06-07 ASSESSMENT — PAIN SCALES - GENERAL: PAINLEVEL_OUTOF10: 8

## 2024-06-07 NOTE — ED TRIAGE NOTES
Pt complains of left foot pain that started roughly a week ago. Left foot is swollen and left 4th toe is black and painful with a wound on the end of his toe. Hx diabetes.

## 2024-06-08 LAB
ACCESSION NUMBER, LLC1M: ABNORMAL
ACINETOBACTER CALCOAC BAUMANNII COMPLEX BY PCR: NOT DETECTED
ANION GAP SERPL CALC-SCNC: 3 MMOL/L (ref 5–15)
BACTEROIDES FRAGILIS BY PCR: NOT DETECTED
BASOPHILS # BLD: 0.1 K/UL (ref 0–0.1)
BASOPHILS NFR BLD: 1 % (ref 0–1)
BIOFIRE TEST COMMENT: ABNORMAL
BUN SERPL-MCNC: 12 MG/DL (ref 6–20)
BUN/CREAT SERPL: 10 (ref 12–20)
CA-I BLD-MCNC: 8.9 MG/DL (ref 8.5–10.1)
CANDIDA ALBICANS BY PCR: NOT DETECTED
CANDIDA AURIS BY PCR: NOT DETECTED
CANDIDA GLABRATA: NOT DETECTED
CANDIDA KRUSEI BY PCR: NOT DETECTED
CANDIDA PARAPSILOSIS BY PCR: NOT DETECTED
CANDIDA TROPICALIS BY PCR: NOT DETECTED
CHLORIDE SERPL-SCNC: 106 MMOL/L (ref 97–108)
CO2 SERPL-SCNC: 29 MMOL/L (ref 21–32)
CREAT SERPL-MCNC: 1.17 MG/DL (ref 0.7–1.3)
CRYPTOCOCCUS NEOFORMANS/GATTII BY PCR: NOT DETECTED
DIFFERENTIAL METHOD BLD: ABNORMAL
ENTEROBACTER CLOACAE COMPLEX BY PCR: NOT DETECTED
ENTEROBACTERALES BY PCR: NOT DETECTED
ENTEROCOCCUS FAECALIS BY PCR: DETECTED
ENTEROCOCCUS FAECIUM BY PCR: NOT DETECTED
EOSINOPHIL # BLD: 0.1 K/UL (ref 0–0.4)
EOSINOPHIL NFR BLD: 1 % (ref 0–7)
ERYTHROCYTE [DISTWIDTH] IN BLOOD BY AUTOMATED COUNT: 12.7 % (ref 11.5–14.5)
ESCHERICHIA COLI: NOT DETECTED
EST. AVERAGE GLUCOSE BLD GHB EST-MCNC: 194 MG/DL
GLUCOSE BLD STRIP.AUTO-MCNC: 118 MG/DL (ref 65–100)
GLUCOSE BLD STRIP.AUTO-MCNC: 199 MG/DL (ref 65–100)
GLUCOSE BLD STRIP.AUTO-MCNC: 202 MG/DL (ref 65–100)
GLUCOSE BLD STRIP.AUTO-MCNC: 238 MG/DL (ref 65–100)
GLUCOSE BLD STRIP.AUTO-MCNC: 322 MG/DL (ref 65–100)
GLUCOSE SERPL-MCNC: 222 MG/DL (ref 65–100)
HAEMOPHILUS INFLUENZAE BY PCR: NOT DETECTED
HBA1C MFR BLD: 8.4 % (ref 4–5.6)
HCT VFR BLD AUTO: 36.1 % (ref 36.6–50.3)
HGB BLD-MCNC: 12.2 G/DL (ref 12.1–17)
IMM GRANULOCYTES # BLD AUTO: 0 K/UL (ref 0–0.04)
IMM GRANULOCYTES NFR BLD AUTO: 0 % (ref 0–0.5)
KLEBSIELLA AEROGENES BY PCR: NOT DETECTED
KLEBSIELLA OXYTOCA BY PCR: NOT DETECTED
KLEBSIELLA PNEUMONIAE GROUP BY PCR: NOT DETECTED
LACTATE SERPL-SCNC: 2.1 MMOL/L (ref 0.4–2)
LISTERIA MONOCYTOGENES BY PCR: NOT DETECTED
LYMPHOCYTES # BLD: 2 K/UL (ref 0.8–3.5)
LYMPHOCYTES NFR BLD: 23 % (ref 12–49)
MCH RBC QN AUTO: 30 PG (ref 26–34)
MCHC RBC AUTO-ENTMCNC: 33.8 G/DL (ref 30–36.5)
MCV RBC AUTO: 88.9 FL (ref 80–99)
MONOCYTES # BLD: 0.8 K/UL (ref 0–1)
MONOCYTES NFR BLD: 10 % (ref 5–13)
NEISSERIA MENINGITIDIS BY PCR: NOT DETECTED
NEUTS SEG # BLD: 5.6 K/UL (ref 1.8–8)
NEUTS SEG NFR BLD: 65 % (ref 32–75)
NRBC # BLD: 0 K/UL (ref 0–0.01)
NRBC BLD-RTO: 0 PER 100 WBC
PERFORMED BY:: ABNORMAL
PLATELET # BLD AUTO: 227 K/UL (ref 150–400)
PMV BLD AUTO: 11 FL (ref 8.9–12.9)
POTASSIUM SERPL-SCNC: 4 MMOL/L (ref 3.5–5.1)
PROTEUS BY PCR: NOT DETECTED
PSEUDOMONAS AERUGINOSA, PSAEP: NOT DETECTED
RBC # BLD AUTO: 4.06 M/UL (ref 4.1–5.7)
RESISTANT GENE TARGETS: ABNORMAL
SALMONELLA SPECIES BY PCR: NOT DETECTED
SERRATIA MARCESCENS BY PCR: NOT DETECTED
SODIUM SERPL-SCNC: 138 MMOL/L (ref 136–145)
STAPHYLOCOCCUS AUREUS: NOT DETECTED
STAPHYLOCOCCUS EPIDERMIDIS BY PCR: NOT DETECTED
STAPHYLOCOCCUS LUGDUNENSIS BY PCR: NOT DETECTED
STAPHYLOCOCCUS: NOT DETECTED
STENOTROPHOMONAS MALTOPHILIA BY PCR: NOT DETECTED
STREPTOCOCCUS AGALACTIAE (GROUP B): NOT DETECTED
STREPTOCOCCUS PNEUMONIAE , SPNP: NOT DETECTED
STREPTOCOCCUS PYOGENES (GROUP A), SPYOP: NOT DETECTED
STREPTOCOCCUS: NOT DETECTED
VANA+VANB ISLT/SPM QL: NOT DETECTED
WBC # BLD AUTO: 8.6 K/UL (ref 4.1–11.1)

## 2024-06-08 PROCEDURE — 83036 HEMOGLOBIN GLYCOSYLATED A1C: CPT

## 2024-06-08 PROCEDURE — 83605 ASSAY OF LACTIC ACID: CPT

## 2024-06-08 PROCEDURE — 36415 COLL VENOUS BLD VENIPUNCTURE: CPT

## 2024-06-08 PROCEDURE — 82962 GLUCOSE BLOOD TEST: CPT

## 2024-06-08 PROCEDURE — 6360000002 HC RX W HCPCS: Performed by: FAMILY MEDICINE

## 2024-06-08 PROCEDURE — 80048 BASIC METABOLIC PNL TOTAL CA: CPT

## 2024-06-08 PROCEDURE — 1100000000 HC RM PRIVATE

## 2024-06-08 PROCEDURE — 6370000000 HC RX 637 (ALT 250 FOR IP): Performed by: FAMILY MEDICINE

## 2024-06-08 PROCEDURE — 85025 COMPLETE CBC W/AUTO DIFF WBC: CPT

## 2024-06-08 PROCEDURE — 2580000003 HC RX 258: Performed by: FAMILY MEDICINE

## 2024-06-08 RX ORDER — ATENOLOL 50 MG/1
100 TABLET ORAL DAILY
Status: DISCONTINUED | OUTPATIENT
Start: 2024-06-08 | End: 2024-06-08

## 2024-06-08 RX ORDER — POLYETHYLENE GLYCOL 3350 17 G/17G
17 POWDER, FOR SOLUTION ORAL DAILY PRN
Status: DISCONTINUED | OUTPATIENT
Start: 2024-06-08 | End: 2024-06-14 | Stop reason: HOSPADM

## 2024-06-08 RX ORDER — GLUCAGON 1 MG/ML
1 KIT INJECTION PRN
Status: DISCONTINUED | OUTPATIENT
Start: 2024-06-08 | End: 2024-06-14 | Stop reason: HOSPADM

## 2024-06-08 RX ORDER — POTASSIUM CHLORIDE 20 MEQ/1
40 TABLET, EXTENDED RELEASE ORAL PRN
Status: DISCONTINUED | OUTPATIENT
Start: 2024-06-08 | End: 2024-06-14 | Stop reason: HOSPADM

## 2024-06-08 RX ORDER — LOSARTAN POTASSIUM AND HYDROCHLOROTHIAZIDE 12.5; 1 MG/1; MG/1
1 TABLET ORAL DAILY
Status: DISCONTINUED | OUTPATIENT
Start: 2024-06-08 | End: 2024-06-08 | Stop reason: CLARIF

## 2024-06-08 RX ORDER — ENOXAPARIN SODIUM 100 MG/ML
30 INJECTION SUBCUTANEOUS 2 TIMES DAILY
Status: DISCONTINUED | OUTPATIENT
Start: 2024-06-08 | End: 2024-06-14 | Stop reason: HOSPADM

## 2024-06-08 RX ORDER — MAGNESIUM SULFATE IN WATER 40 MG/ML
2000 INJECTION, SOLUTION INTRAVENOUS PRN
Status: DISCONTINUED | OUTPATIENT
Start: 2024-06-08 | End: 2024-06-14 | Stop reason: HOSPADM

## 2024-06-08 RX ORDER — SODIUM CHLORIDE 9 MG/ML
INJECTION, SOLUTION INTRAVENOUS PRN
Status: DISCONTINUED | OUTPATIENT
Start: 2024-06-08 | End: 2024-06-14 | Stop reason: HOSPADM

## 2024-06-08 RX ORDER — HYDROCHLOROTHIAZIDE 25 MG/1
12.5 TABLET ORAL DAILY
Status: DISCONTINUED | OUTPATIENT
Start: 2024-06-08 | End: 2024-06-14 | Stop reason: HOSPADM

## 2024-06-08 RX ORDER — SODIUM CHLORIDE 0.9 % (FLUSH) 0.9 %
5-40 SYRINGE (ML) INJECTION EVERY 12 HOURS SCHEDULED
Status: DISCONTINUED | OUTPATIENT
Start: 2024-06-08 | End: 2024-06-14 | Stop reason: HOSPADM

## 2024-06-08 RX ORDER — ACETAMINOPHEN 325 MG/1
650 TABLET ORAL EVERY 6 HOURS PRN
Status: DISCONTINUED | OUTPATIENT
Start: 2024-06-08 | End: 2024-06-14 | Stop reason: HOSPADM

## 2024-06-08 RX ORDER — LOSARTAN POTASSIUM 50 MG/1
100 TABLET ORAL DAILY
Status: DISCONTINUED | OUTPATIENT
Start: 2024-06-08 | End: 2024-06-14 | Stop reason: HOSPADM

## 2024-06-08 RX ORDER — INSULIN LISPRO 100 [IU]/ML
0-4 INJECTION, SOLUTION INTRAVENOUS; SUBCUTANEOUS NIGHTLY
Status: DISCONTINUED | OUTPATIENT
Start: 2024-06-08 | End: 2024-06-14 | Stop reason: HOSPADM

## 2024-06-08 RX ORDER — ASPIRIN 81 MG/1
81 TABLET ORAL DAILY
Status: DISCONTINUED | OUTPATIENT
Start: 2024-06-08 | End: 2024-06-14 | Stop reason: HOSPADM

## 2024-06-08 RX ORDER — BRIMONIDINE TARTRATE 1 MG/ML
1 SOLUTION/ DROPS OPHTHALMIC 2 TIMES DAILY
COMMUNITY

## 2024-06-08 RX ORDER — ONDANSETRON 2 MG/ML
4 INJECTION INTRAMUSCULAR; INTRAVENOUS EVERY 6 HOURS PRN
Status: DISCONTINUED | OUTPATIENT
Start: 2024-06-08 | End: 2024-06-14 | Stop reason: HOSPADM

## 2024-06-08 RX ORDER — ACETAMINOPHEN 650 MG/1
650 SUPPOSITORY RECTAL EVERY 6 HOURS PRN
Status: DISCONTINUED | OUTPATIENT
Start: 2024-06-08 | End: 2024-06-14 | Stop reason: HOSPADM

## 2024-06-08 RX ORDER — DORZOLAMIDE HYDROCHLORIDE AND TIMOLOL MALEATE 20; 5 MG/ML; MG/ML
1 SOLUTION/ DROPS OPHTHALMIC 2 TIMES DAILY
COMMUNITY

## 2024-06-08 RX ORDER — ONDANSETRON 4 MG/1
4 TABLET, ORALLY DISINTEGRATING ORAL EVERY 8 HOURS PRN
Status: DISCONTINUED | OUTPATIENT
Start: 2024-06-08 | End: 2024-06-14 | Stop reason: HOSPADM

## 2024-06-08 RX ORDER — SODIUM CHLORIDE 0.9 % (FLUSH) 0.9 %
5-40 SYRINGE (ML) INJECTION PRN
Status: DISCONTINUED | OUTPATIENT
Start: 2024-06-08 | End: 2024-06-14 | Stop reason: HOSPADM

## 2024-06-08 RX ORDER — DEXTROSE MONOHYDRATE 100 MG/ML
INJECTION, SOLUTION INTRAVENOUS CONTINUOUS PRN
Status: DISCONTINUED | OUTPATIENT
Start: 2024-06-08 | End: 2024-06-14 | Stop reason: HOSPADM

## 2024-06-08 RX ORDER — POTASSIUM CHLORIDE 7.45 MG/ML
10 INJECTION INTRAVENOUS PRN
Status: DISCONTINUED | OUTPATIENT
Start: 2024-06-08 | End: 2024-06-14 | Stop reason: HOSPADM

## 2024-06-08 RX ORDER — INSULIN LISPRO 100 [IU]/ML
0-8 INJECTION, SOLUTION INTRAVENOUS; SUBCUTANEOUS
Status: DISCONTINUED | OUTPATIENT
Start: 2024-06-08 | End: 2024-06-14 | Stop reason: HOSPADM

## 2024-06-08 RX ORDER — TIMOLOL MALEATE 5 MG/ML
1 SOLUTION/ DROPS OPHTHALMIC DAILY
Status: DISCONTINUED | OUTPATIENT
Start: 2024-06-08 | End: 2024-06-14 | Stop reason: HOSPADM

## 2024-06-08 RX ORDER — VITAMIN B COMPLEX
1000 TABLET ORAL DAILY
Status: DISCONTINUED | OUTPATIENT
Start: 2024-06-08 | End: 2024-06-14 | Stop reason: HOSPADM

## 2024-06-08 RX ADMIN — PIPERACILLIN AND TAZOBACTAM 3375 MG: 3; .375 INJECTION, POWDER, LYOPHILIZED, FOR SOLUTION INTRAVENOUS at 02:14

## 2024-06-08 RX ADMIN — HYDROCHLOROTHIAZIDE 12.5 MG: 25 TABLET ORAL at 18:49

## 2024-06-08 RX ADMIN — ENOXAPARIN SODIUM 30 MG: 100 INJECTION SUBCUTANEOUS at 08:31

## 2024-06-08 RX ADMIN — SODIUM CHLORIDE, PRESERVATIVE FREE 10 ML: 5 INJECTION INTRAVENOUS at 08:32

## 2024-06-08 RX ADMIN — SODIUM CHLORIDE: 9 INJECTION, SOLUTION INTRAVENOUS at 02:15

## 2024-06-08 RX ADMIN — LOSARTAN POTASSIUM 100 MG: 50 TABLET, FILM COATED ORAL at 18:48

## 2024-06-08 RX ADMIN — PIPERACILLIN AND TAZOBACTAM 3375 MG: 3; .375 INJECTION, POWDER, LYOPHILIZED, FOR SOLUTION INTRAVENOUS at 14:16

## 2024-06-08 RX ADMIN — ASPIRIN 81 MG: 81 TABLET, COATED ORAL at 18:37

## 2024-06-08 RX ADMIN — VANCOMYCIN HYDROCHLORIDE 1500 MG: 750 INJECTION, POWDER, LYOPHILIZED, FOR SOLUTION INTRAVENOUS at 23:44

## 2024-06-08 RX ADMIN — Medication 1000 UNITS: at 18:48

## 2024-06-08 RX ADMIN — INSULIN LISPRO 2 UNITS: 100 INJECTION, SOLUTION INTRAVENOUS; SUBCUTANEOUS at 08:31

## 2024-06-08 RX ADMIN — INSULIN GLARGINE 20 UNITS: 100 INJECTION, SOLUTION SUBCUTANEOUS at 21:01

## 2024-06-08 RX ADMIN — PIPERACILLIN AND TAZOBACTAM 3375 MG: 3; .375 INJECTION, POWDER, LYOPHILIZED, FOR SOLUTION INTRAVENOUS at 19:36

## 2024-06-08 RX ADMIN — ENOXAPARIN SODIUM 30 MG: 100 INJECTION SUBCUTANEOUS at 21:00

## 2024-06-08 RX ADMIN — SODIUM CHLORIDE, PRESERVATIVE FREE 10 ML: 5 INJECTION INTRAVENOUS at 21:02

## 2024-06-08 RX ADMIN — TIMOLOL MALEATE 1 DROP: 5 SOLUTION OPHTHALMIC at 18:50

## 2024-06-08 RX ADMIN — METFORMIN HYDROCHLORIDE 500 MG: 500 TABLET ORAL at 19:42

## 2024-06-08 RX ADMIN — INSULIN LISPRO 2 UNITS: 100 INJECTION, SOLUTION INTRAVENOUS; SUBCUTANEOUS at 17:35

## 2024-06-08 RX ADMIN — INSULIN LISPRO 4 UNITS: 100 INJECTION, SOLUTION INTRAVENOUS; SUBCUTANEOUS at 21:01

## 2024-06-08 NOTE — PLAN OF CARE
Problem: ABCDS Injury Assessment  Goal: Absence of physical injury  6/8/2024 0754 by Bettye Galvan LPN  Outcome: Progressing  6/8/2024 0239 by Agnieszka Rosales RN  Outcome: Progressing     Problem: Safety - Adult  Goal: Free from fall injury  6/8/2024 0754 by Bettye Galvan LPN  Outcome: Progressing  6/8/2024 0239 by Agnieszka Rosales, RN  Outcome: Progressing

## 2024-06-08 NOTE — ED NOTES
ED TO INPATIENT SBAR HANDOFF    Patient Name: Marcus Zimmerman   Preferred Name: Marcus  : 1935  89 y.o.   Family/Caregiver Present: no   Code Status Order: No Order  PO Status: NPO:  Telemetry Order:   C-SSRS: Risk of Suicide: No Risk  Sitter no   Restraints:     Sepsis Risk Score Sepsis Risk Score: 1.17    Situation  Chief Complaint   Patient presents with    Foot Pain     Brief Description of Patient's Condition: L foot pain, L 4th toe is black and painful. Hx of diabetes. Sepsis workup  Mental Status: oriented and alert  Arrived from:Home  Imaging:   XR FOOT RIGHT (MIN 3 VIEWS)   Final Result   No acute osseous abnormality.      Electronically signed by CARRILLO HOLLINS        Abnormal labs:   Abnormal Labs Reviewed   COMPREHENSIVE METABOLIC PANEL - Abnormal; Notable for the following components:       Result Value    Glucose 181 (*)     BUN/Creatinine Ratio 11 (*)     Est, Glom Filt Rate 58 (*)     AST 11 (*)     Albumin 3.3 (*)     Globulin 4.8 (*)     Albumin/Globulin Ratio 0.7 (*)     All other components within normal limits   CBC WITH AUTO DIFFERENTIAL - Abnormal; Notable for the following components:    Monocytes Absolute 1.1 (*)     All other components within normal limits       Background  Allergies: No Known Allergies  History:   Past Medical History:   Diagnosis Date    Burning with urination     Diabetes (HCC)     Hypertension        Assessment  Vitals: MEWS Score: 1  Level of Consciousness: Alert (0)   Vitals:    24 1755 24 2237   BP: (!) 174/82 (!) 172/88   Pulse: 95 89   Resp: 21 16   Temp: 100 °F (37.8 °C) 98.7 °F (37.1 °C)   TempSrc: Oral Oral   SpO2: 100% 100%   Weight: 121.6 kg (268 lb)    Height: 1.854 m (6' 1\")      Deterioration Index (DI): Deterioration Index: 23.74  Deterioration Index (DI) Interventions Performed:    O2 Flow Rate:    O2 Device: O2 Device: None (Room air)  Cardiac Rhythm:    Critical Lab Results: [unfilled]  Cultures: Cultures:Blood  NIH Score: NIH

## 2024-06-08 NOTE — PROGRESS NOTES
4 Eyes Skin Assessment     NAME:  Marcus Zimmerman  YOB: 1935  MEDICAL RECORD NUMBER:  [id number]    The patient is being assessed for  Admission    I agree that at least one RN has performed a thorough Head to Toe Skin Assessment on the patient. ALL assessment sites listed below have been assessed.      Areas assessed by both nurses:    Head, Face, Ears, Shoulders, Back, Chest, Arms, Elbows, Hands, Sacrum. Buttock, Coccyx, Ischium, Legs. Feet and Heels, and Under Medical Devices         Does the Patient have a Wound? Yes wound(s) were present on assessment. LDA wound assessment was Initiated and completed by RN       Willis Prevention initiated by RN: Yes  Wound Care Orders initiated by RN: No    Pressure Injury (Stage 3,4, Unstageable, DTI, NWPT, and Complex wounds) if present, place Wound referral order by RN under : No    New Ostomies, if present place, Ostomy referral order under : No     Nurse 1 eSignature: Electronically signed by ANDREY BURNETT RN on 6/8/24 at 6:07 AM EDT    **SHARE this note so that the co-signing nurse can place an eSignature**    Nurse 2 eSignature: Electronically signed by Linda Sanchez RN on 6/8/24 at 6:42 AM EDT

## 2024-06-08 NOTE — PLAN OF CARE
Problem: Discharge Planning  Goal: Discharge to home or other facility with appropriate resources  Outcome: Progressing  Flowsheets (Taken 6/8/2024 0020)  Discharge to home or other facility with appropriate resources:   Identify barriers to discharge with patient and caregiver   Arrange for needed discharge resources and transportation as appropriate     Problem: ABCDS Injury Assessment  Goal: Absence of physical injury  Outcome: Progressing     Problem: Safety - Adult  Goal: Free from fall injury  Outcome: Progressing

## 2024-06-08 NOTE — PROGRESS NOTES
Hospitalist Progress Note    NAME:   Marcus Zimmerman   : 1935   MRN: [id number]     Date/Time: 2024 11:38 AM  Patient PCP: Joel Bauer MD    Estimated discharge date:  Barriers:       Assessment / Plan:    Diabetic right second toe wound  No radiologic concern for osteomyelitis on x-ray initial evaluation.  Continue Zosyn and vancomycin for now.  Screen for MRSA colonization.    Podiatry consultation pending for further wound care and biopsy evaluation    Type 1 diabetes  Patient at home on Humalog 70/30 twice daily.  Currently placed on Lantus 20 units daily.  Continue on Accu-Cheks.    Essential hypertension  Continue home meds.    DVT prophylaxis  Lovenox SC    Medical Decision Making:   I personally reviewed labs: CBC, BMP  I personally reviewed imaging:  I personally reviewed EKG:  Toxic drug monitoring:     Discussed case with: Patient, family, nursing    Code Status: Full code      Subjective:     Chief Complaint / Reason for Physician Visit  \" Right foot pain\".  Discussed with RN events overnight.      - Admission H&P  89 y.o. male with multiple medical problems including type 2 diabetes and hypertension who presents to the emergency department accompanied by his daughter for evaluation.  Patient has been in his usual state of health.  He noticed that there was an issue with the wound of the second toe of his right foot.  Apparently according to the history provided by the daughter he was soaking it in some sort of solution.  Unsure of how long it has been there but patient states it has been bothering him for more's for the last 3 days.  He denies any fever, chills, sweats.  No nausea vomiting or diarrhea.  He was otherwise feeling fine with no other specific complaints.  Upon evaluation in the ED, routine lab work was generally unremarkable, there was no leukocytosis.  X-ray of the right foot demonstrates second toe soft tissue distal ulcer without apparent osteomyelitis, fracture,

## 2024-06-08 NOTE — CARE COORDINATION
06/08/24 1045   Service Assessment   Patient Orientation Alert and Oriented   Cognition Alert   History Provided By Patient   Primary Caregiver Self   Accompanied By/Relationship family members   Support Systems Children;Family Members;Spouse/Significant Other   Patient's Healthcare Decision Maker is: Legal Next of Kin   PCP Verified by CM Yes   Last Visit to PCP Within last 3 months   Prior Functional Level Assistance with the following:;Mobility   Current Functional Level Assistance with the following:;Mobility   Can patient return to prior living arrangement Yes   Ability to make needs known: Good   Family able to assist with home care needs: Yes   Would you like for me to discuss the discharge plan with any other family members/significant others, and if so, who? Yes   Financial Resources Medicare   Social/Functional History   Lives With Spouse;Daughter   Type of Home House   Home Layout Two level   Bathroom Shower/Tub Tub/Shower unit   Bathroom Toilet Standard   Home Equipment Walker - Standard;Cane   ADL Assistance Independent   Homemaking Assistance Independent   Ambulation Assistance Needs assistance   Transfer Assistance Independent   Active  Yes   Mode of Transportation Car   Occupation Retired   Discharge Planning   Type of Residence House   Living Arrangements Spouse/Significant Other;Children   Type of Home Care Services None   Patient expects to be discharged to: House   Services At/After Discharge   Services At/After Discharge None   Mode of Transport at Discharge Other (see comment)  (daughter or son)   Confirm Follow Up Transport Family     CM met with the patient at the bedside to complete assessment. Patient had multiple family members visiting. Patient reports living at home in a two story house with his wife and daughter, Maritne. He reports being mostly independent. He uses a cane and a walker at times. Patient reports performing his own ADL's. He follows regularly with his PCP and  either he drives himself or his children transport him. Patient does not have any home health in place at this time. Does not anticipate any at discharge. CM will continue to follow.    DC plan is home with family care.    Advance Care Planning     General Advance Care Planning (ACP) Conversation    Date of Conversation: 6/8/2024  Conducted with: Patient with Decision Making Capacity  Other persons present: None    Healthcare Decision Maker:   Primary Decision Maker: Martine Zimmerman - Child - [phone]  Click here to complete Healthcare Decision Makers including selection of the Healthcare Decision Maker Relationship (ie \"Primary\").       Content/Action Overview:  Has ACP document(s) on file - reflects the patient's care preferences  Reviewed DNR/DNI and patient elects Full Code (Attempt Resuscitation)      Mitesh Brown

## 2024-06-08 NOTE — ED PROVIDER NOTES
[facility] EMERGENCY DEPT  EMERGENCY DEPARTMENT HISTORY AND PHYSICAL EXAM      Date: 6/7/2024  Patient Name: Marcus Zimmerman  MRN: [id number]  YOB: 1935  Date of evaluation: 6/7/2024  Provider: Nick Chinchilla MD   Note Started: 8:09 PM EDT 6/7/24    HISTORY OF PRESENT ILLNESS     Chief Complaint   Patient presents with    Foot Pain       History Provided By: Patient    HPI: Marcus Zimmerman is a 89 y.o. male presents to the emergency department for evaluation of left foot pain, redness, ulceration.  Patient states he noticed an ulcer to his second toe several days ago.  Denies any injury that he recalls.  Patient states over the last several days has noticed worsening pain to region.      PAST MEDICAL HISTORY   Past Medical History:  Past Medical History:   Diagnosis Date    Burning with urination     Diabetes (HCC)     Hypertension        Past Surgical History:  Past Surgical History:   Procedure Laterality Date    COLONOSCOPY N/A 8/21/2017    COLONOSCOPY performed by Nelson Lange MD at [facility] ENDOSCOPY    NM ABDOMEN SURGERY PROC UNLISTED      partial colectomy (Dr. Gary Randhawa)       Family History:  Family History   Problem Relation Age of Onset    Post-op Nausea/Vomiting Neg Hx     Emergence Delirium Neg Hx     Other Neg Hx     Delayed Awakening Neg Hx     Pseudochol. Deficiency Neg Hx     Malig Hypertherm Neg Hx     Post-op Cognitive Dysfunction Neg Hx        Social History:  Social History     Tobacco Use    Smoking status: Former    Smokeless tobacco: Never   Substance Use Topics    Alcohol use: No    Drug use: No       Allergies:  No Known Allergies    PCP: Jole Bauer MD    Current Meds:   Current Facility-Administered Medications   Medication Dose Route Frequency Provider Last Rate Last Admin    [START ON 6/8/2024] insulin glargine (LANTUS) injection vial 20 Units  20 Units SubCUTAneous Nightly Stefan Ibarra MD        [START ON 6/8/2024] piperacillin-tazobactam (ZOSYN) 3,375 mg in sodium  Patient's lab work resulting, CBC shows no leukocytosis, lactic acid within normal limits.  Metabolic panel grossly normal, patient's x-ray does not show any signs of acute osteomyelitis, given significant erythema with drainage feel that patient should be admitted for IV antibiotics, possible podiatry eval.  [PZ]      ED Course User Index  [PZ] Nick Chinchilla MD   I discussed case with Dr. Ibarra, will admit patient for infected diabetic foot ulcer    SEPSIS Reassessment: Sepsis reassessment not applicable    Clinical Management Tools:  Not Applicable    Patient was given the following medications:  Medications   insulin glargine (LANTUS) injection vial 20 Units (has no administration in time range)   piperacillin-tazobactam (ZOSYN) 3,375 mg in sodium chloride 0.9 % 50 mL IVPB (mini-bag) (has no administration in time range)   piperacillin-tazobactam (ZOSYN) 4,500 mg in sodium chloride 0.9 % 100 mL IVPB (mini-bag) (0 mg IntraVENous Stopped 6/7/24 2022)   vancomycin (VANCOCIN) 2,500 mg in sodium chloride 0.9 % 500 mL IVPB (0 mg IntraVENous Stopped 6/7/24 2323)       CONSULTS: See ED Course/MDM for further details.  IP CONSULT TO PHARMACY     Social Determinants affecting Diagnosis/Treatment: None    Smoking Cessation: Not Applicable    PROCEDURES   Unless otherwise noted above, none.  Procedures      CRITICAL CARE TIME   Patient does not meet Critical Care Time, 0 minutes    ED IMPRESSION   No diagnosis found.      DISPOSITION/PLAN   DISPOSITION Admitted 06/07/2024 10:54:35 PM    Admit Note: Pt is being admitted by hospitalist team. The results of their tests and reason(s) for their admission have been discussed with pt and/or available family. They convey agreement and understanding for the need to be admitted and for the admission diagnosis.     PATIENT REFERRED TO:  No follow-up provider specified.      DISCHARGE MEDICATIONS:     Medication List        ASK your doctor about these medications      aspirin 81

## 2024-06-08 NOTE — PROGRESS NOTES
Piperacillin-tazobactam (Zosyn) Extended-Infusion Dosing/Monitoring  Current regimen: 3.375 g IV every 6 hours    Recent Labs     06/07/24  1900   CREATININE 1.20   BUN 13     Estimated CrCl: 58 mL/min    Plan: Change to 3.375 g IV over 240 minutes every 8 hours per [facility] P&T Committee Protocol with respect to extended-infusion ?-lactam antibiotics. Pharmacy will continue to monitor patient daily and will make dosage adjustments based upon changing renal function.

## 2024-06-08 NOTE — PROGRESS NOTES
Vancomycin Dosing Consult  Marcus Zimmerman is a 89 y.o. male with ulcer of foot. Pharmacy was consulted by Dr. Ibarra to dose and monitor vancomycin. Today is day 1.    Antibiotic regimen: Vancomycin + zosyn    Temp (24hrs), Av.2 °F (37.3 °C), Min:98.7 °F (37.1 °C), Max:100 °F (37.8 °C)    Recent Labs     24  1900 24  1942   WBC  --  9.9   CREATININE 1.20  --    BUN 13  --      Est CrCl: 58 mL/min  Concomitant nephrotoxic drugs: None    Cultures:   Blood x 2 - pending    MRSA Swab: Not ordered, patient already received first dose of vancomycin    Target range: AUC/OWEN 400-600    Recent level history:  Date/Time Dose & Interval Measured Level (mcg/mL) Associated AUC/OWEN              Assessment/Plan:   Patient received Vancomycin 2500 mg in the ED 24 @   Begin Vancomycin 1500 mg IV every 24 hours  Vancomycin trough level has been scheduled 24 @ 1800  Antimicrobial stop date 7 days per consult

## 2024-06-08 NOTE — H&P
History & Physical    Primary Care Provider: Joel Bauer MD  Source of Information: Patient/family     Chief complaint:   Chief Complaint   Patient presents with    Foot Pain        History of Presenting Illness:   Marcus Zimmerman is a 89 y.o. male with multiple medical problems including type 2 diabetes and hypertension who presents to the emergency department accompanied by his daughter for evaluation.  Patient has been in his usual state of health.  He noticed that there was an issue with the wound of the second toe of his right foot.  Apparently according to the history provided by the daughter he was soaking it in some sort of solution.  Unsure of how long it has been there but patient states it has been bothering him for more's for the last 3 days.  He denies any fever, chills, sweats.  No nausea vomiting or diarrhea.  He was otherwise feeling fine with no other specific complaints.  Upon evaluation in the ED, routine lab work was generally unremarkable, there was no leukocytosis.  X-ray of the right foot demonstrates second toe soft tissue distal ulcer without apparent osteomyelitis, fracture, dislocation.       Review of Systems:  14 system review performed, Pertinent items are noted in the History of Present Illness.     Past Medical History:   Diagnosis Date    Burning with urination     Diabetes (HCC)     Hypertension         Past Surgical History:   Procedure Laterality Date    COLONOSCOPY N/A 8/21/2017    COLONOSCOPY performed by Nelson Lange MD at [facility] ENDOSCOPY    [place] ABDOMEN SURGERY PROC UNLISTED      partial colectomy (Dr. Gary Randhawa)       Prior to Admission medications    Medication Sig Start Date End Date Taking? Authorizing Provider   aspirin 81 MG EC tablet Take 81 mg by mouth daily    Automatic Reconciliation, Ar   atenolol (TENORMIN) 100 MG tablet Take 100 mg by mouth daily    Automatic Reconciliation, Ar   vitamin D 25 MCG (1000 UT) CAPS Take by mouth daily    Automatic  7:34 PM   Result Value Ref Range    Lactic Acid, Plasma 1.5 0.4 - 2.0 mmol/L   CBC with Auto Differential    Collection Time: 06/07/24  7:42 PM   Result Value Ref Range    WBC 9.9 4.1 - 11.1 K/uL    RBC 4.16 4.10 - 5.70 M/uL    Hemoglobin 12.6 12.1 - 17.0 g/dL    Hematocrit 37.1 36.6 - 50.3 %    MCV 89.2 80.0 - 99.0 FL    MCH 30.3 26.0 - 34.0 PG    MCHC 34.0 30.0 - 36.5 g/dL    RDW 12.7 11.5 - 14.5 %    Platelets 255 150 - 400 K/uL    MPV 10.5 8.9 - 12.9 FL    Nucleated RBCs 0.0 0.0  WBC    nRBC 0.00 0.00 - 0.01 K/uL    Neutrophils % 64 32 - 75 %    Lymphocytes % 23 12 - 49 %    Monocytes % 11 5 - 13 %    Eosinophils % 1 0 - 7 %    Basophils % 1 0 - 1 %    Immature Granulocytes % 0 0 - 0.5 %    Neutrophils Absolute 6.4 1.8 - 8.0 K/UL    Lymphocytes Absolute 2.3 0.8 - 3.5 K/UL    Monocytes Absolute 1.1 (H) 0.0 - 1.0 K/UL    Eosinophils Absolute 0.1 0.0 - 0.4 K/UL    Basophils Absolute 0.1 0.0 - 0.1 K/UL    Immature Granulocytes Absolute 0.0 0.00 - 0.04 K/UL    Differential Type AUTOMATED           Imaging:   XR FOOT RIGHT (MIN 3 VIEWS)   Final Result   No acute osseous abnormality.      Electronically signed by CARRILLO HOLLINS             Discussion/Medical Decision Making: Patient with numerous medical comorbidities, each with increased risk for mortality and morbidity if left untreated.   I have reviewed patient's presenting subjective and objective findings, as well as all laboratory studies, imaging studies, and vital signs to date as well as treatment rendered  and patient's response to those treatments.  In addition, prior medical, surgical and relevant social and family histories were reviewed.    I have discussed patient's presentation/findings and clinical course to date with ED provider. Given the patient's current clinical presentation, I have a high level of concern for decompensation if discharged from the emergency department and that patient warrants admission to hospital.     Assessment:

## 2024-06-09 LAB
ANION GAP SERPL CALC-SCNC: 5 MMOL/L (ref 5–15)
BASOPHILS # BLD: 0.1 K/UL (ref 0–0.1)
BASOPHILS NFR BLD: 1 % (ref 0–1)
BUN SERPL-MCNC: 12 MG/DL (ref 6–20)
BUN/CREAT SERPL: 11 (ref 12–20)
CA-I BLD-MCNC: 9 MG/DL (ref 8.5–10.1)
CHLORIDE SERPL-SCNC: 105 MMOL/L (ref 97–108)
CO2 SERPL-SCNC: 26 MMOL/L (ref 21–32)
CREAT SERPL-MCNC: 1.07 MG/DL (ref 0.7–1.3)
CRP SERPL-MCNC: 1.77 MG/DL (ref 0–0.3)
DIFFERENTIAL METHOD BLD: ABNORMAL
EOSINOPHIL # BLD: 0.1 K/UL (ref 0–0.4)
EOSINOPHIL NFR BLD: 1 % (ref 0–7)
ERYTHROCYTE [DISTWIDTH] IN BLOOD BY AUTOMATED COUNT: 12.5 % (ref 11.5–14.5)
GLUCOSE BLD STRIP.AUTO-MCNC: 148 MG/DL (ref 65–100)
GLUCOSE BLD STRIP.AUTO-MCNC: 150 MG/DL (ref 65–100)
GLUCOSE BLD STRIP.AUTO-MCNC: 193 MG/DL (ref 65–100)
GLUCOSE BLD STRIP.AUTO-MCNC: 244 MG/DL (ref 65–100)
GLUCOSE SERPL-MCNC: 160 MG/DL (ref 65–100)
HCT VFR BLD AUTO: 35.3 % (ref 36.6–50.3)
HGB BLD-MCNC: 11.9 G/DL (ref 12.1–17)
IMM GRANULOCYTES # BLD AUTO: 0 K/UL (ref 0–0.04)
IMM GRANULOCYTES NFR BLD AUTO: 0 % (ref 0–0.5)
LYMPHOCYTES # BLD: 2.4 K/UL (ref 0.8–3.5)
LYMPHOCYTES NFR BLD: 29 % (ref 12–49)
MCH RBC QN AUTO: 30.3 PG (ref 26–34)
MCHC RBC AUTO-ENTMCNC: 33.7 G/DL (ref 30–36.5)
MCV RBC AUTO: 89.8 FL (ref 80–99)
MONOCYTES # BLD: 0.9 K/UL (ref 0–1)
MONOCYTES NFR BLD: 11 % (ref 5–13)
MRSA DNA SPEC QL NAA+PROBE: NOT DETECTED
NEUTS SEG # BLD: 4.6 K/UL (ref 1.8–8)
NEUTS SEG NFR BLD: 58 % (ref 32–75)
NRBC # BLD: 0 K/UL (ref 0–0.01)
NRBC BLD-RTO: 0 PER 100 WBC
PERFORMED BY:: ABNORMAL
PLATELET # BLD AUTO: 218 K/UL (ref 150–400)
PMV BLD AUTO: 10.5 FL (ref 8.9–12.9)
POTASSIUM SERPL-SCNC: 3.6 MMOL/L (ref 3.5–5.1)
PROCALCITONIN SERPL-MCNC: <0.05 NG/ML
RBC # BLD AUTO: 3.93 M/UL (ref 4.1–5.7)
SODIUM SERPL-SCNC: 136 MMOL/L (ref 136–145)
VANCOMYCIN TROUGH SERPL-MCNC: 11.2 UG/ML (ref 5–10)
WBC # BLD AUTO: 8.1 K/UL (ref 4.1–11.1)

## 2024-06-09 PROCEDURE — 86140 C-REACTIVE PROTEIN: CPT

## 2024-06-09 PROCEDURE — 87641 MR-STAPH DNA AMP PROBE: CPT

## 2024-06-09 PROCEDURE — 6370000000 HC RX 637 (ALT 250 FOR IP): Performed by: FAMILY MEDICINE

## 2024-06-09 PROCEDURE — 80202 ASSAY OF VANCOMYCIN: CPT

## 2024-06-09 PROCEDURE — 6360000002 HC RX W HCPCS: Performed by: FAMILY MEDICINE

## 2024-06-09 PROCEDURE — 82962 GLUCOSE BLOOD TEST: CPT

## 2024-06-09 PROCEDURE — 80048 BASIC METABOLIC PNL TOTAL CA: CPT

## 2024-06-09 PROCEDURE — 1100000000 HC RM PRIVATE

## 2024-06-09 PROCEDURE — 2580000003 HC RX 258: Performed by: FAMILY MEDICINE

## 2024-06-09 PROCEDURE — 84145 PROCALCITONIN (PCT): CPT

## 2024-06-09 PROCEDURE — 85025 COMPLETE CBC W/AUTO DIFF WBC: CPT

## 2024-06-09 RX ADMIN — HYDROCHLOROTHIAZIDE 12.5 MG: 25 TABLET ORAL at 09:53

## 2024-06-09 RX ADMIN — TIMOLOL MALEATE 1 DROP: 5 SOLUTION OPHTHALMIC at 09:55

## 2024-06-09 RX ADMIN — INSULIN GLARGINE 20 UNITS: 100 INJECTION, SOLUTION SUBCUTANEOUS at 20:53

## 2024-06-09 RX ADMIN — PIPERACILLIN AND TAZOBACTAM 3375 MG: 3; .375 INJECTION, POWDER, LYOPHILIZED, FOR SOLUTION INTRAVENOUS at 10:10

## 2024-06-09 RX ADMIN — ENOXAPARIN SODIUM 30 MG: 100 INJECTION SUBCUTANEOUS at 09:52

## 2024-06-09 RX ADMIN — METFORMIN HYDROCHLORIDE 500 MG: 500 TABLET ORAL at 18:11

## 2024-06-09 RX ADMIN — SODIUM CHLORIDE, PRESERVATIVE FREE 10 ML: 5 INJECTION INTRAVENOUS at 20:53

## 2024-06-09 RX ADMIN — LOSARTAN POTASSIUM 100 MG: 50 TABLET, FILM COATED ORAL at 09:53

## 2024-06-09 RX ADMIN — VANCOMYCIN HYDROCHLORIDE 1750 MG: 1 INJECTION, POWDER, LYOPHILIZED, FOR SOLUTION INTRAVENOUS at 20:58

## 2024-06-09 RX ADMIN — ENOXAPARIN SODIUM 30 MG: 100 INJECTION SUBCUTANEOUS at 20:53

## 2024-06-09 RX ADMIN — PIPERACILLIN AND TAZOBACTAM 3375 MG: 3; .375 INJECTION, POWDER, LYOPHILIZED, FOR SOLUTION INTRAVENOUS at 18:11

## 2024-06-09 RX ADMIN — ASPIRIN 81 MG: 81 TABLET, COATED ORAL at 09:53

## 2024-06-09 RX ADMIN — PIPERACILLIN AND TAZOBACTAM 3375 MG: 3; .375 INJECTION, POWDER, LYOPHILIZED, FOR SOLUTION INTRAVENOUS at 04:24

## 2024-06-09 RX ADMIN — ACETAMINOPHEN 650 MG: 325 TABLET ORAL at 20:57

## 2024-06-09 RX ADMIN — SODIUM CHLORIDE, PRESERVATIVE FREE 10 ML: 5 INJECTION INTRAVENOUS at 09:55

## 2024-06-09 RX ADMIN — METFORMIN HYDROCHLORIDE 500 MG: 500 TABLET ORAL at 08:03

## 2024-06-09 RX ADMIN — Medication 1000 UNITS: at 09:53

## 2024-06-09 ASSESSMENT — PAIN - FUNCTIONAL ASSESSMENT: PAIN_FUNCTIONAL_ASSESSMENT: ACTIVITIES ARE NOT PREVENTED

## 2024-06-09 ASSESSMENT — PAIN DESCRIPTION - DESCRIPTORS: DESCRIPTORS: BURNING

## 2024-06-09 ASSESSMENT — PAIN SCALES - GENERAL: PAINLEVEL_OUTOF10: 3

## 2024-06-09 ASSESSMENT — PAIN DESCRIPTION - LOCATION: LOCATION: FOOT

## 2024-06-09 NOTE — PLAN OF CARE
Problem: Discharge Planning  Goal: Discharge to home or other facility with appropriate resources  6/9/2024 0045 by Rossana Yousif RN  Outcome: Progressing     Problem: ABCDS Injury Assessment  Goal: Absence of physical injury  6/9/2024 1401 by Bettye Galvan LPN  Outcome: Progressing  6/9/2024 0045 by Rossana Yousif RN  Outcome: Progressing     Problem: Safety - Adult  Goal: Free from fall injury  6/9/2024 1401 by Bettye Galvan LPN  Outcome: Progressing  6/9/2024 0045 by Rossana Yousif RN  Outcome: Progressing     Problem: Discharge Planning  Goal: Discharge to home or other facility with appropriate resources  6/9/2024 0045 by Rossana Yousif RN  Outcome: Progressing     Problem: ABCDS Injury Assessment  Goal: Absence of physical injury  6/9/2024 1401 by Bettye Galvan LPN  Outcome: Progressing  6/9/2024 0045 by Rossana Yousif RN  Outcome: Progressing     Problem: Safety - Adult  Goal: Free from fall injury  6/9/2024 1401 by Bettye Galvan LPN  Outcome: Progressing  6/9/2024 0045 by Rossana Yousif RN  Outcome: Progressing     Problem: ABCDS Injury Assessment  Goal: Absence of physical injury  6/9/2024 1401 by Bettey Galvan LPN  Outcome: Progressing  6/9/2024 0045 by Rossana Yousif RN  Outcome: Progressing     Problem: Safety - Adult  Goal: Free from fall injury  6/9/2024 1401 by Bettye Galvan LPN  Outcome: Progressing  6/9/2024 0045 by Rossana Yousif RN  Outcome: Progressing

## 2024-06-09 NOTE — PROGRESS NOTES
Vancomycin Dosing Consult  Marcus Zimmerman is a 89 y.o. male with ulcer of foot. Pharmacy was consulted by Dr. Ibarra to dose and monitor vancomycin. Today is day 2.    Antibiotic regimen: Vancomycin + zosyn    Temp (24hrs), Av.1 °F (36.7 °C), Min:97.5 °F (36.4 °C), Max:99 °F (37.2 °C)    Recent Labs     24  1900 24  1942 24  1314 24  0623   WBC  --  9.9 8.6 8.1   CREATININE 1.20  --  1.17 1.07   BUN 13  --  12 12     Est CrCl: 65 mL/min  Concomitant nephrotoxic drugs: None    Cultures:    Blood: Enterococcus faecalis in 1 of 4 bottles (PCR negative for Van A/B resistance genes)    MRSA Swab: Not detected 2024    Target range: AUC/OWEN 400-600    Recent level history:  Date/Time Dose & Interval Measured Level (mcg/mL) Associated AUC/OWEN    @ 1736 2500mg x 1, then 1500mg q24h 11.2 357        Assessment/Plan:   Pt admitted d/t R foot pain with likely diabetic foot infection. XR negative for findings of osteomyelitis, but CT or MRI would be more definitive to help rule out. MRSA nares screen was negative. Blood cultures growing 1 of 4 bottles + for E.faecalis. Clinically stable without fever of sepsis may point towards contaminant despite the fact it is not a skin  pathogen.  Renal function with some improvement since admission, but overall stable. Unsure of baseline renal function. Continue AUC guided dosing  Level resulted this evening at 11.2 (AUC < 400 as of tonight)  Increase vancomycin to 1750mg q24h (predicted AUC of 519 with attainment of AUC > 400 with next dose)  Next level scheduled for  @ 1800  BMP ordered through   Antimicrobial stop date 7 days per consult

## 2024-06-09 NOTE — PROGRESS NOTES
Hospitalist Progress Note    NAME:   Marcus Zimmerman   : 1935   MRN: [id number]     Date/Time: 2024 9:49 AM  Patient PCP: Joel Bauer MD    Estimated discharge date:  Barriers:       Assessment / Plan:    Diabetic right second toe wound  No radiologic concern for osteomyelitis on x-ray initial evaluation.  Continue Zosyn and vancomycin for now.  Screen for MRSA colonization.    Podiatry consultation pending for further wound care and biopsy evaluation.    Type 1 diabetes  Patient at home on Humalog 70/30 twice daily.  Currently placed on Lantus 20 units daily.  Continue on Accu-Cheks.    Enterococcus bacteremia/contamination  1 out of 4 blood cultures positive for GPC/Enterococcus which is likely contamination.  No fevers, sepsis, SIRS or any other sequelae of severe infection noted.    Essential hypertension  Continue home meds.    DVT prophylaxis  Lovenox SC    Medical Decision Making:   I personally reviewed labs: CBC, BMP  I personally reviewed imaging:  I personally reviewed EKG:  Toxic drug monitoring:     Discussed case with: Patient, family, nursing    Code Status: Full code      Subjective:     Chief Complaint / Reason for Physician Visit  \" Right foot pain\".  Discussed with RN events overnight.      - Admission H&P  89 y.o. male with multiple medical problems including type 2 diabetes and hypertension who presents to the emergency department accompanied by his daughter for evaluation.  Patient has been in his usual state of health.  He noticed that there was an issue with the wound of the second toe of his right foot.  Apparently according to the history provided by the daughter he was soaking it in some sort of solution.  Unsure of how long it has been there but patient states it has been bothering him for more's for the last 3 days.  He denies any fever, chills, sweats.  No nausea vomiting or diarrhea.  He was otherwise feeling fine with no other specific complaints.  Upon  radiology test results   YES  Review and summation of old records today    NO  Reviewed patient's current orders and MAR    YES  PMH/SH reviewed - no change compared to H&P    Procedures: see electronic medical records for all procedures/Xrays and details which were not copied into this note but were reviewed prior to creation of Plan.      LABS:  I reviewed today's most current labs and imaging studies.  Pertinent labs include:  Recent Labs     06/07/24  1942 06/08/24  1314 06/09/24  0623   WBC 9.9 8.6 8.1   HGB 12.6 12.2 11.9*   HCT 37.1 36.1* 35.3*    227 218     Recent Labs     06/07/24  1900 06/08/24  1314 06/09/24  0623    138 136   K 4.0 4.0 3.6    106 105   CO2 29 29 26   GLUCOSE 181* 222* 160*   BUN 13 12 12   CREATININE 1.20 1.17 1.07   CALCIUM 9.3 8.9 9.0   BILITOT 0.3  --   --    AST 11*  --   --    ALT 17  --   --        Signed: JIMBO TURPIN MD

## 2024-06-10 ENCOUNTER — APPOINTMENT (OUTPATIENT)
Facility: HOSPITAL | Age: 89
End: 2024-06-10
Payer: MEDICARE

## 2024-06-10 LAB
ANION GAP SERPL CALC-SCNC: 5 MMOL/L (ref 5–15)
BACTERIA SPEC CULT: ABNORMAL
BACTERIA SPEC CULT: ABNORMAL
BASOPHILS # BLD: 0.1 K/UL (ref 0–0.1)
BASOPHILS NFR BLD: 1 % (ref 0–1)
BUN SERPL-MCNC: 11 MG/DL (ref 6–20)
BUN/CREAT SERPL: 10 (ref 12–20)
CA-I BLD-MCNC: 9.1 MG/DL (ref 8.5–10.1)
CHLORIDE SERPL-SCNC: 105 MMOL/L (ref 97–108)
CO2 SERPL-SCNC: 28 MMOL/L (ref 21–32)
CREAT SERPL-MCNC: 1.07 MG/DL (ref 0.7–1.3)
CRP SERPL-MCNC: 1.26 MG/DL (ref 0–0.3)
DIFFERENTIAL METHOD BLD: NORMAL
EOSINOPHIL # BLD: 0.1 K/UL (ref 0–0.4)
EOSINOPHIL NFR BLD: 2 % (ref 0–7)
ERYTHROCYTE [DISTWIDTH] IN BLOOD BY AUTOMATED COUNT: 12.4 % (ref 11.5–14.5)
GLUCOSE BLD STRIP.AUTO-MCNC: 164 MG/DL (ref 65–100)
GLUCOSE BLD STRIP.AUTO-MCNC: 195 MG/DL (ref 65–100)
GLUCOSE BLD STRIP.AUTO-MCNC: 197 MG/DL (ref 65–100)
GLUCOSE SERPL-MCNC: 166 MG/DL (ref 65–100)
HCT VFR BLD AUTO: 37 % (ref 36.6–50.3)
HGB BLD-MCNC: 12.4 G/DL (ref 12.1–17)
IMM GRANULOCYTES # BLD AUTO: 0 K/UL (ref 0–0.04)
IMM GRANULOCYTES NFR BLD AUTO: 0 % (ref 0–0.5)
LYMPHOCYTES # BLD: 2.3 K/UL (ref 0.8–3.5)
LYMPHOCYTES NFR BLD: 29 % (ref 12–49)
Lab: ABNORMAL
MCH RBC QN AUTO: 30 PG (ref 26–34)
MCHC RBC AUTO-ENTMCNC: 33.5 G/DL (ref 30–36.5)
MCV RBC AUTO: 89.4 FL (ref 80–99)
MONOCYTES # BLD: 0.7 K/UL (ref 0–1)
MONOCYTES NFR BLD: 9 % (ref 5–13)
NEUTS SEG # BLD: 4.7 K/UL (ref 1.8–8)
NEUTS SEG NFR BLD: 59 % (ref 32–75)
NRBC # BLD: 0 K/UL (ref 0–0.01)
NRBC BLD-RTO: 0 PER 100 WBC
PERFORMED BY:: ABNORMAL
PLATELET # BLD AUTO: 264 K/UL (ref 150–400)
PMV BLD AUTO: 10.6 FL (ref 8.9–12.9)
POTASSIUM SERPL-SCNC: 3.6 MMOL/L (ref 3.5–5.1)
PROCALCITONIN SERPL-MCNC: <0.05 NG/ML
RBC # BLD AUTO: 4.14 M/UL (ref 4.1–5.7)
SODIUM SERPL-SCNC: 138 MMOL/L (ref 136–145)
WBC # BLD AUTO: 7.9 K/UL (ref 4.1–11.1)

## 2024-06-10 PROCEDURE — 82962 GLUCOSE BLOOD TEST: CPT

## 2024-06-10 PROCEDURE — 2580000003 HC RX 258: Performed by: FAMILY MEDICINE

## 2024-06-10 PROCEDURE — 85025 COMPLETE CBC W/AUTO DIFF WBC: CPT

## 2024-06-10 PROCEDURE — 6360000002 HC RX W HCPCS: Performed by: FAMILY MEDICINE

## 2024-06-10 PROCEDURE — 84145 PROCALCITONIN (PCT): CPT

## 2024-06-10 PROCEDURE — 36415 COLL VENOUS BLD VENIPUNCTURE: CPT

## 2024-06-10 PROCEDURE — 86140 C-REACTIVE PROTEIN: CPT

## 2024-06-10 PROCEDURE — 73718 MRI LOWER EXTREMITY W/O DYE: CPT

## 2024-06-10 PROCEDURE — 99223 1ST HOSP IP/OBS HIGH 75: CPT | Performed by: PODIATRIST

## 2024-06-10 PROCEDURE — 80048 BASIC METABOLIC PNL TOTAL CA: CPT

## 2024-06-10 PROCEDURE — 6370000000 HC RX 637 (ALT 250 FOR IP): Performed by: FAMILY MEDICINE

## 2024-06-10 PROCEDURE — 1100000000 HC RM PRIVATE

## 2024-06-10 RX ADMIN — ENOXAPARIN SODIUM 30 MG: 100 INJECTION SUBCUTANEOUS at 20:40

## 2024-06-10 RX ADMIN — TIMOLOL MALEATE 1 DROP: 5 SOLUTION OPHTHALMIC at 09:04

## 2024-06-10 RX ADMIN — PIPERACILLIN AND TAZOBACTAM 3375 MG: 3; .375 INJECTION, POWDER, LYOPHILIZED, FOR SOLUTION INTRAVENOUS at 09:15

## 2024-06-10 RX ADMIN — SODIUM CHLORIDE, PRESERVATIVE FREE 10 ML: 5 INJECTION INTRAVENOUS at 09:04

## 2024-06-10 RX ADMIN — METFORMIN HYDROCHLORIDE 500 MG: 500 TABLET ORAL at 16:54

## 2024-06-10 RX ADMIN — PIPERACILLIN AND TAZOBACTAM 3375 MG: 3; .375 INJECTION, POWDER, LYOPHILIZED, FOR SOLUTION INTRAVENOUS at 16:54

## 2024-06-10 RX ADMIN — VANCOMYCIN HYDROCHLORIDE 1750 MG: 1 INJECTION, POWDER, LYOPHILIZED, FOR SOLUTION INTRAVENOUS at 20:44

## 2024-06-10 RX ADMIN — SODIUM CHLORIDE, PRESERVATIVE FREE 10 ML: 5 INJECTION INTRAVENOUS at 20:40

## 2024-06-10 RX ADMIN — Medication 1000 UNITS: at 09:02

## 2024-06-10 RX ADMIN — HYDROCHLOROTHIAZIDE 12.5 MG: 25 TABLET ORAL at 09:03

## 2024-06-10 RX ADMIN — INSULIN GLARGINE 20 UNITS: 100 INJECTION, SOLUTION SUBCUTANEOUS at 20:40

## 2024-06-10 RX ADMIN — ENOXAPARIN SODIUM 30 MG: 100 INJECTION SUBCUTANEOUS at 09:03

## 2024-06-10 RX ADMIN — LOSARTAN POTASSIUM 100 MG: 50 TABLET, FILM COATED ORAL at 09:02

## 2024-06-10 RX ADMIN — METFORMIN HYDROCHLORIDE 500 MG: 500 TABLET ORAL at 09:02

## 2024-06-10 RX ADMIN — ASPIRIN 81 MG: 81 TABLET, COATED ORAL at 09:02

## 2024-06-10 RX ADMIN — PIPERACILLIN AND TAZOBACTAM 3375 MG: 3; .375 INJECTION, POWDER, LYOPHILIZED, FOR SOLUTION INTRAVENOUS at 01:49

## 2024-06-10 ASSESSMENT — PAIN SCALES - GENERAL: PAINLEVEL_OUTOF10: 0

## 2024-06-10 NOTE — PLAN OF CARE
Problem: Discharge Planning  Goal: Discharge to home or other facility with appropriate resources  Outcome: Progressing     Problem: ABCDS Injury Assessment  Goal: Absence of physical injury  6/9/2024 2251 by Flor Jack RN  Outcome: Progressing  6/9/2024 1401 by Bettye Galvan LPN  Outcome: Progressing     Problem: Safety - Adult  Goal: Free from fall injury  6/9/2024 2251 by Flor aJck RN  Outcome: Progressing  6/9/2024 1401 by Bettye Galvan LPN  Outcome: Progressing

## 2024-06-10 NOTE — PLAN OF CARE
Problem: ABCDS Injury Assessment  Goal: Absence of physical injury  6/10/2024 1248 by Bettye Galvan LPN  Outcome: Progressing  6/9/2024 2251 by Flor Jack, RN  Outcome: Progressing     Problem: Safety - Adult  Goal: Free from fall injury  6/10/2024 1248 by Bettye Galvan LPN  Outcome: Progressing  6/9/2024 2251 by Flor Jack, RN  Outcome: Progressing

## 2024-06-10 NOTE — CONSULTS
[facility] PODIATRY & FOOT SURGERY     CONSULT NOTE      Subjective:         Date of Consultation: Sanna 10, 2024     Referring Physician: Stefan Ibarra MD     Patient is a 89 y.o. male who is being seen for ulcer, right second toe.   Patient has a hx of diabetes mellitus type 1, glaucoma, BPH and hypertension.  Patient presented to the emergency department on 06/07/2024 complaining of right foot pain.  States he noticed a wound to the right second toe a few weeks prior.  States the area has worsened.  Denies any outpatient follow-up.  Denies any overt trauma.  Denies any systemic signs of infection but admits to local drainage.  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 Gary Randhawa)      Family History   Problem Relation Age of Onset    Post-op Nausea/Vomiting Neg Hx     Emergence Delirium Neg Hx     Other  Neg Hx     Delayed Awakening Neg Hx     Pseudochol. Deficiency Neg Hx     Malig Hypertherm Neg Hx     Post-op Cognitive Dysfunction Neg Hx       Social History     Tobacco Use    Smoking status: Former    Smokeless tobacco: Never   Substance Use Topics    Alcohol use: No     Current Facility-Administered Medications   Medication Dose Route Frequency Provider Last Rate Last Admin    vancomycin (VANCOCIN) 1,750 mg in sodium chloride 0.9 % 500 mL IVPB  1,750 mg IntraVENous Q24H Stefan Ibarra MD   Stopped at 06/09/24 5528    [START ON 6/12/2024] Vancomycin Trough: Please draw level on 6/12 at 1800  1 each Other Once Stefan Ibarra MD        aspirin EC tablet 81 mg  81 mg Oral Daily Stefan Ibarra MD   81 mg at 06/10/24 0902    metFORMIN (GLUCOPHAGE) tablet 500 mg  500 mg Oral BID WC Stefan Ibarra MD   500 mg at 06/10/24 0902    Netarsudil Dimesylate (RHOPRESSA) 0.02 % ophthalmic solution SOLN 1 drop  1 drop Both Eyes QPM Stefan Ibarra MD        timolol (TIMOPTIC) 0.5 % ophthalmic solution 1 drop  1 drop Both Eyes Daily Stefan Ibarra MD   1 drop at 06/10/24 0904    Vitamin D (CHOLECALCIFEROL) tablet 1,000 Units  1,000 Units Oral Daily Stefan Ibarra MD   1,000 Units at 06/10/24 0902    sodium chloride flush 0.9 % injection 5-40 mL  5-40 mL IntraVENous 2 times per day Stefan Ibarra MD   10 mL at 06/10/24 0904    sodium chloride flush 0.9 % injection 5-40 mL  5-40 mL IntraVENous PRN Stefan Ibarra MD        0.9 % sodium chloride infusion   IntraVENous PRN Stefan Ibarra MD 5 mL/hr at 06/08/24 0215 New Bag at 06/08/24 0215    potassium chloride (KLOR-CON M) extended release tablet 40 mEq  40 mEq Oral PRN Stefan Ibarra MD        Or    potassium bicarb-citric acid (EFFER-K) effervescent tablet 40 mEq  40 mEq Oral PRN Stefan Ibarra MD        Or    potassium chloride 10 mEq/100 mL IVPB (Peripheral Line)  10 mEq IntraVENous PRN Stefan Ibarra MD

## 2024-06-10 NOTE — PROGRESS NOTES
Hospitalist Progress Note    NAME:   Marcus Zimmerman   : 1935   MRN: [id number]     Date/Time: 6/10/2024 9:11 AM  Patient PCP: Joel Bauer MD    Estimated discharge date:  Barriers:       Assessment / Plan:    Diabetic right second toe wound  No radiologic concern for osteomyelitis on x-ray initial evaluation.  Continue Zosyn day #4    - with negative MRSA screening, discontinued vancomycin.  Podiatry consultation pending for further wound care and biopsy evaluation.    Type 1 diabetes  Patient at home on Humalog 70/30 twice daily.  Currently placed on Lantus 20 units daily.  Continue on Accu-Cheks.    Enterococcus bacteremia/contamination  1 out of 4 blood cultures positive for GPC/Enterococcus which is likely contamination.  No fevers, sepsis, SIRS or any other sequelae of severe infection noted.    Essential hypertension  Continue home meds.    DVT prophylaxis  Lovenox SC    Medical Decision Making:   I personally reviewed labs: CBC, BMP  I personally reviewed imaging:  I personally reviewed EKG:  Toxic drug monitoring:     Discussed case with: Patient, family, nursing    Code Status: Full code      Subjective:     Chief Complaint / Reason for Physician Visit  \" Right foot pain\".  Discussed with RN events overnight.      - Admission H&P  89 y.o. male with multiple medical problems including type 2 diabetes and hypertension who presents to the emergency department accompanied by his daughter for evaluation.  Patient has been in his usual state of health.  He noticed that there was an issue with the wound of the second toe of his right foot.  Apparently according to the history provided by the daughter he was soaking it in some sort of solution.  Unsure of how long it has been there but patient states it has been bothering him for more's for the last 3 days.  He denies any fever, chills, sweats.  No nausea vomiting or diarrhea.  He was otherwise feeling fine with no other specific

## 2024-06-10 NOTE — CARE COORDINATION
CM reviewed chart. Podiatry consult pending. On IV antibiotics. PT/OT ordered for evaluation.    Current discharge plan is home.

## 2024-06-11 LAB
ANION GAP SERPL CALC-SCNC: 7 MMOL/L (ref 5–15)
BASOPHILS # BLD: 0.1 K/UL (ref 0–0.1)
BASOPHILS NFR BLD: 1 % (ref 0–1)
BUN SERPL-MCNC: 10 MG/DL (ref 6–20)
BUN/CREAT SERPL: 9 (ref 12–20)
CA-I BLD-MCNC: 9.1 MG/DL (ref 8.5–10.1)
CHLORIDE SERPL-SCNC: 104 MMOL/L (ref 97–108)
CO2 SERPL-SCNC: 25 MMOL/L (ref 21–32)
CREAT SERPL-MCNC: 1.1 MG/DL (ref 0.7–1.3)
CRP SERPL-MCNC: 0.85 MG/DL (ref 0–0.3)
DIFFERENTIAL METHOD BLD: ABNORMAL
EOSINOPHIL # BLD: 0.1 K/UL (ref 0–0.4)
EOSINOPHIL NFR BLD: 1 % (ref 0–7)
ERYTHROCYTE [DISTWIDTH] IN BLOOD BY AUTOMATED COUNT: 12.7 % (ref 11.5–14.5)
GLUCOSE BLD STRIP.AUTO-MCNC: 132 MG/DL (ref 65–100)
GLUCOSE BLD STRIP.AUTO-MCNC: 149 MG/DL (ref 65–100)
GLUCOSE BLD STRIP.AUTO-MCNC: 173 MG/DL (ref 65–100)
GLUCOSE BLD STRIP.AUTO-MCNC: 197 MG/DL (ref 65–100)
GLUCOSE SERPL-MCNC: 165 MG/DL (ref 65–100)
HCT VFR BLD AUTO: 37.2 % (ref 36.6–50.3)
HGB BLD-MCNC: 12.1 G/DL (ref 12.1–17)
IMM GRANULOCYTES # BLD AUTO: 0 K/UL (ref 0–0.04)
IMM GRANULOCYTES NFR BLD AUTO: 1 % (ref 0–0.5)
LYMPHOCYTES # BLD: 2.2 K/UL (ref 0.8–3.5)
LYMPHOCYTES NFR BLD: 28 % (ref 12–49)
MCH RBC QN AUTO: 29.8 PG (ref 26–34)
MCHC RBC AUTO-ENTMCNC: 32.5 G/DL (ref 30–36.5)
MCV RBC AUTO: 91.6 FL (ref 80–99)
MONOCYTES # BLD: 0.7 K/UL (ref 0–1)
MONOCYTES NFR BLD: 10 % (ref 5–13)
NEUTS SEG # BLD: 4.7 K/UL (ref 1.8–8)
NEUTS SEG NFR BLD: 59 % (ref 32–75)
NRBC # BLD: 0 K/UL (ref 0–0.01)
NRBC BLD-RTO: 0 PER 100 WBC
PERFORMED BY:: ABNORMAL
PLATELET # BLD AUTO: 242 K/UL (ref 150–400)
PMV BLD AUTO: 10.3 FL (ref 8.9–12.9)
POTASSIUM SERPL-SCNC: 3.5 MMOL/L (ref 3.5–5.1)
PROCALCITONIN SERPL-MCNC: <0.05 NG/ML
RBC # BLD AUTO: 4.06 M/UL (ref 4.1–5.7)
SODIUM SERPL-SCNC: 136 MMOL/L (ref 136–145)
WBC # BLD AUTO: 7.8 K/UL (ref 4.1–11.1)

## 2024-06-11 PROCEDURE — 80048 BASIC METABOLIC PNL TOTAL CA: CPT

## 2024-06-11 PROCEDURE — 82962 GLUCOSE BLOOD TEST: CPT

## 2024-06-11 PROCEDURE — 86140 C-REACTIVE PROTEIN: CPT

## 2024-06-11 PROCEDURE — 36415 COLL VENOUS BLD VENIPUNCTURE: CPT

## 2024-06-11 PROCEDURE — 84145 PROCALCITONIN (PCT): CPT

## 2024-06-11 PROCEDURE — 2580000003 HC RX 258: Performed by: FAMILY MEDICINE

## 2024-06-11 PROCEDURE — 99232 SBSQ HOSP IP/OBS MODERATE 35: CPT | Performed by: PODIATRIST

## 2024-06-11 PROCEDURE — 6360000002 HC RX W HCPCS: Performed by: FAMILY MEDICINE

## 2024-06-11 PROCEDURE — 1100000000 HC RM PRIVATE

## 2024-06-11 PROCEDURE — 85025 COMPLETE CBC W/AUTO DIFF WBC: CPT

## 2024-06-11 PROCEDURE — 6370000000 HC RX 637 (ALT 250 FOR IP): Performed by: FAMILY MEDICINE

## 2024-06-11 RX ADMIN — SODIUM CHLORIDE, PRESERVATIVE FREE 10 ML: 5 INJECTION INTRAVENOUS at 20:40

## 2024-06-11 RX ADMIN — PIPERACILLIN AND TAZOBACTAM 3375 MG: 3; .375 INJECTION, POWDER, LYOPHILIZED, FOR SOLUTION INTRAVENOUS at 09:18

## 2024-06-11 RX ADMIN — PIPERACILLIN AND TAZOBACTAM 3375 MG: 3; .375 INJECTION, POWDER, LYOPHILIZED, FOR SOLUTION INTRAVENOUS at 18:32

## 2024-06-11 RX ADMIN — LOSARTAN POTASSIUM 100 MG: 50 TABLET, FILM COATED ORAL at 09:16

## 2024-06-11 RX ADMIN — INSULIN GLARGINE 20 UNITS: 100 INJECTION, SOLUTION SUBCUTANEOUS at 20:39

## 2024-06-11 RX ADMIN — VANCOMYCIN HYDROCHLORIDE 1750 MG: 1 INJECTION, POWDER, LYOPHILIZED, FOR SOLUTION INTRAVENOUS at 20:39

## 2024-06-11 RX ADMIN — Medication 1000 UNITS: at 09:16

## 2024-06-11 RX ADMIN — SODIUM CHLORIDE, PRESERVATIVE FREE 10 ML: 5 INJECTION INTRAVENOUS at 09:15

## 2024-06-11 RX ADMIN — METFORMIN HYDROCHLORIDE 500 MG: 500 TABLET ORAL at 18:32

## 2024-06-11 RX ADMIN — ASPIRIN 81 MG: 81 TABLET, COATED ORAL at 09:16

## 2024-06-11 RX ADMIN — PIPERACILLIN AND TAZOBACTAM 3375 MG: 3; .375 INJECTION, POWDER, LYOPHILIZED, FOR SOLUTION INTRAVENOUS at 01:42

## 2024-06-11 RX ADMIN — ENOXAPARIN SODIUM 30 MG: 100 INJECTION SUBCUTANEOUS at 09:17

## 2024-06-11 RX ADMIN — TIMOLOL MALEATE 1 DROP: 5 SOLUTION OPHTHALMIC at 09:16

## 2024-06-11 RX ADMIN — METFORMIN HYDROCHLORIDE 500 MG: 500 TABLET ORAL at 09:17

## 2024-06-11 RX ADMIN — ENOXAPARIN SODIUM 30 MG: 100 INJECTION SUBCUTANEOUS at 20:40

## 2024-06-11 RX ADMIN — HYDROCHLOROTHIAZIDE 12.5 MG: 25 TABLET ORAL at 09:16

## 2024-06-11 ASSESSMENT — PAIN SCALES - GENERAL: PAINLEVEL_OUTOF10: 0

## 2024-06-11 ASSESSMENT — ENCOUNTER SYMPTOMS
VOMITING: 0
ABDOMINAL PAIN: 0
SHORTNESS OF BREATH: 0
DIARRHEA: 0

## 2024-06-11 NOTE — PROGRESS NOTES
OT eval order received and acknowledged. OT eval attempted at 1204 however awaiting podiatry/surgical plan. Will continue to follow patient and attempt OT eval at a later time. Thank you.

## 2024-06-11 NOTE — PLAN OF CARE
Problem: Safety - Adult  Goal: Free from fall injury  6/10/2024 2346 by Flor Zaragoza, RN  Outcome: Progressing    Problem: Pain  Goal: Verbalizes/displays adequate comfort level or baseline comfort level  Outcome: Progressing

## 2024-06-12 ENCOUNTER — APPOINTMENT (OUTPATIENT)
Facility: HOSPITAL | Age: 89
End: 2024-06-12
Attending: PODIATRIST
Payer: MEDICARE

## 2024-06-12 LAB
ANION GAP SERPL CALC-SCNC: 6 MMOL/L (ref 5–15)
BUN SERPL-MCNC: 10 MG/DL (ref 6–20)
BUN/CREAT SERPL: 9 (ref 12–20)
CA-I BLD-MCNC: 8.9 MG/DL (ref 8.5–10.1)
CHLORIDE SERPL-SCNC: 106 MMOL/L (ref 97–108)
CO2 SERPL-SCNC: 28 MMOL/L (ref 21–32)
CREAT SERPL-MCNC: 1.08 MG/DL (ref 0.7–1.3)
GLUCOSE BLD STRIP.AUTO-MCNC: 116 MG/DL (ref 65–100)
GLUCOSE BLD STRIP.AUTO-MCNC: 140 MG/DL (ref 65–100)
GLUCOSE BLD STRIP.AUTO-MCNC: 155 MG/DL (ref 65–100)
GLUCOSE BLD STRIP.AUTO-MCNC: 242 MG/DL (ref 65–100)
GLUCOSE SERPL-MCNC: 143 MG/DL (ref 65–100)
PERFORMED BY:: ABNORMAL
POTASSIUM SERPL-SCNC: 3.5 MMOL/L (ref 3.5–5.1)
SODIUM SERPL-SCNC: 140 MMOL/L (ref 136–145)
VANCOMYCIN SERPL-MCNC: 14.4 UG/ML

## 2024-06-12 PROCEDURE — 93922 UPR/L XTREMITY ART 2 LEVELS: CPT

## 2024-06-12 PROCEDURE — 2580000003 HC RX 258: Performed by: FAMILY MEDICINE

## 2024-06-12 PROCEDURE — 6370000000 HC RX 637 (ALT 250 FOR IP): Performed by: INTERNAL MEDICINE

## 2024-06-12 PROCEDURE — 6370000000 HC RX 637 (ALT 250 FOR IP): Performed by: FAMILY MEDICINE

## 2024-06-12 PROCEDURE — 6360000002 HC RX W HCPCS: Performed by: FAMILY MEDICINE

## 2024-06-12 PROCEDURE — 1100000000 HC RM PRIVATE

## 2024-06-12 PROCEDURE — 80202 ASSAY OF VANCOMYCIN: CPT

## 2024-06-12 PROCEDURE — 80048 BASIC METABOLIC PNL TOTAL CA: CPT

## 2024-06-12 PROCEDURE — 36415 COLL VENOUS BLD VENIPUNCTURE: CPT

## 2024-06-12 PROCEDURE — 99233 SBSQ HOSP IP/OBS HIGH 50: CPT | Performed by: PODIATRIST

## 2024-06-12 PROCEDURE — 82962 GLUCOSE BLOOD TEST: CPT

## 2024-06-12 RX ORDER — AMLODIPINE BESYLATE 5 MG/1
10 TABLET ORAL DAILY
Status: DISCONTINUED | OUTPATIENT
Start: 2024-06-12 | End: 2024-06-14 | Stop reason: HOSPADM

## 2024-06-12 RX ADMIN — ASPIRIN 81 MG: 81 TABLET, COATED ORAL at 10:57

## 2024-06-12 RX ADMIN — PIPERACILLIN AND TAZOBACTAM 3375 MG: 3; .375 INJECTION, POWDER, LYOPHILIZED, FOR SOLUTION INTRAVENOUS at 18:34

## 2024-06-12 RX ADMIN — METFORMIN HYDROCHLORIDE 500 MG: 500 TABLET ORAL at 10:58

## 2024-06-12 RX ADMIN — ENOXAPARIN SODIUM 30 MG: 100 INJECTION SUBCUTANEOUS at 10:59

## 2024-06-12 RX ADMIN — PIPERACILLIN AND TAZOBACTAM 3375 MG: 3; .375 INJECTION, POWDER, LYOPHILIZED, FOR SOLUTION INTRAVENOUS at 01:40

## 2024-06-12 RX ADMIN — ENOXAPARIN SODIUM 30 MG: 100 INJECTION SUBCUTANEOUS at 21:22

## 2024-06-12 RX ADMIN — TIMOLOL MALEATE 1 DROP: 5 SOLUTION OPHTHALMIC at 10:56

## 2024-06-12 RX ADMIN — HYDROCHLOROTHIAZIDE 12.5 MG: 25 TABLET ORAL at 10:57

## 2024-06-12 RX ADMIN — LOSARTAN POTASSIUM 100 MG: 50 TABLET, FILM COATED ORAL at 10:58

## 2024-06-12 RX ADMIN — AMLODIPINE BESYLATE 10 MG: 5 TABLET ORAL at 10:57

## 2024-06-12 RX ADMIN — SODIUM CHLORIDE, PRESERVATIVE FREE 10 ML: 5 INJECTION INTRAVENOUS at 08:00

## 2024-06-12 RX ADMIN — Medication 1000 UNITS: at 10:58

## 2024-06-12 RX ADMIN — VANCOMYCIN HYDROCHLORIDE 1750 MG: 1 INJECTION, POWDER, LYOPHILIZED, FOR SOLUTION INTRAVENOUS at 22:44

## 2024-06-12 RX ADMIN — METFORMIN HYDROCHLORIDE 500 MG: 500 TABLET ORAL at 18:34

## 2024-06-12 RX ADMIN — PIPERACILLIN AND TAZOBACTAM 3375 MG: 3; .375 INJECTION, POWDER, LYOPHILIZED, FOR SOLUTION INTRAVENOUS at 10:59

## 2024-06-12 RX ADMIN — INSULIN GLARGINE 20 UNITS: 100 INJECTION, SOLUTION SUBCUTANEOUS at 21:22

## 2024-06-12 RX ADMIN — SODIUM CHLORIDE, PRESERVATIVE FREE 10 ML: 5 INJECTION INTRAVENOUS at 21:22

## 2024-06-12 NOTE — PROGRESS NOTES
Hospitalist Progress Note    NAME:   Marcus Zimmerman   : 1935   MRN: [id number]     Date/Time: 2024 9:10 AM  Patient PCP: Joel Bauer MD    Estimated discharge date:  Barriers:       Assessment / Plan:    Diabetic right second toe wound  No radiologic concern for osteomyelitis on x-ray initial evaluation.  On Vanco/Zosyn day #6    - with negative MRSA screening, discontinued vancomycin.  Podiatry consultation pending for further wound care and biopsy evaluation.  MRI of the foot ordered pending results for surgical planning. Possible OR once     Type 1 diabetes.  A1c 8.4  Patient at home on Humalog 70/30 twice daily.  Currently placed on Lantus 20 units daily.  Continue on Accu-Cheks.    Enterococcus bacteremia/contamination  1 out of 4 blood cultures positive for GPC/Enterococcus which is likely contamination.  No fevers, sepsis, SIRS or any other sequelae of severe infection noted.    Essential hypertension  Continue home meds.  Started on amlodipine given elevated BP on home regime.     DVT prophylaxis  Lovenox SC    Medical Decision Making:   I personally reviewed labs: CBC, BMP  I personally reviewed imaging:  I personally reviewed EKG:  Toxic drug monitoring:     Discussed case with: Patient, family, nursing    Code Status: Full code      Subjective:     Chief Complaint / Reason for Physician Visit  \" Right foot pain\".  Discussed with RN events overnight.      - Admission H&P  89 y.o. male with multiple medical problems including type 2 diabetes and hypertension who presents to the emergency department accompanied by his daughter for evaluation.  Patient has been in his usual state of health.  He noticed that there was an issue with the wound of the second toe of his right foot.  Apparently according to the history provided by the daughter he was soaking it in some sort of solution.  Unsure of how long it has been there but patient states it has been bothering him for more's for

## 2024-06-12 NOTE — PROGRESS NOTES
warm from proximal to distal for B/L LE. Neg homas/amado signs to B/L LE. No varicosities or telangectasias noted to B/L LE.  NEURO: Protective and epicritic sensations grossly absent to B/L LE  MSK: (-) POP, No gross deformities. Good muscle tone and bulk noted to B/L LE.  PSYCH: Cooperative with normal mood and affect       Labs/Imaging/Diagnostics    Labs:  CBC:  Recent Labs     06/10/24  0655 06/11/24  0846   WBC 7.9 7.8   RBC 4.14 4.06*   HGB 12.4 12.1   HCT 37.0 37.2   MCV 89.4 91.6   RDW 12.4 12.7    242     CHEMISTRIES:  Recent Labs     06/10/24  0655 06/11/24  0846 06/12/24  0745    136 140   K 3.6 3.5 3.5    104 106   CO2 28 25 28   BUN 11 10 10   CREATININE 1.07 1.10 1.08   GLUCOSE 166* 165* 143*     PT/INR:No results for input(s): \"PROTIME\", \"INR\" in the last 72 hours.  APTT:No results for input(s): \"APTT\" in the last 72 hours.  LIVER PROFILE:No results for input(s): \"AST\", \"ALT\", \"BILIDIR\", \"BILITOT\", \"ALKPHOS\" in the last 72 hours.    Imaging Last 24 Hours:  No results found.      Assessment//Plan           Hospital Problems             Last Modified POA    * (Principal) Cellulitis and abscess of toe of right foot 6/7/2024 Yes     Assessment & Plan    Diabetic ulcer, right second toe  Uncontrolled DM T1 with neuropathy  PAD      Patient was seen and evaluated at bedside.  - Current labs personally reviewed and findings dicussed with patient  - Pending Non invasive vascular studies    - Pending MRI results for surgical plaining  - Tx options reviewed, conservative vs surgical. Possible complications discussed. Pt has elected for surgical intervention. In depth convo had regarding preop, intraop and postop surgical protocols. NPO after midnight and consent to be signed/witnessed. Tentatively plan for a right second toe amputation  - Rec leaving open to air at this time.  Offload while in bed.  Heel touch to the right otherwise  - Cont empiric abx  - We we will follow closely    Thank  you!          Justin Dillon DPM, CWSP, AACFAS    [facility]  [address] [place] [address]  O: [phone]  F: [phone]    [facility]  [address]  [place] [address]  O: [phone]  F: [phone]    Critical access hospital [facility]  [address], [address]  [place] [address]  O: [phone]  F: [phone]    * Available via [employer] 24/7      Electronically signed by Justin Dillon DPM on 6/12/24 at 3:11 PM EDT

## 2024-06-12 NOTE — PROGRESS NOTES
PT order received.  Currently will hold PT eval and await podiatry plan for possible toe amputation.  Pt has been observed up amb in room without difficulty.

## 2024-06-12 NOTE — PROGRESS NOTES
Vancomycin Dosing Consult  Marcus Zimmerman is a 89 y.o. male with ulcer of foot. Pharmacy was consulted by Dr. Ibarra to dose and monitor vancomycin. Today is day 3.    Antibiotic regimen: Vancomycin + zosyn    Temp (24hrs), Av.2 °F (36.8 °C), Min:97.7 °F (36.5 °C), Max:98.6 °F (37 °C)    Recent Labs     06/10/24  0655 24  0846 24  0745   WBC 7.9 7.8  --    CREATININE 1.07 1.10 1.08   BUN 11 10 10     Est CrCl: 65 mL/min  Concomitant nephrotoxic drugs: None    Cultures:    Blood: Enterococcus faecalis in 1 of 4 bottles (PCR negative for Van A/B resistance genes)    MRSA Swab: Not detected 2024    Target range: AUC/OWEN 400-600    Recent level history:  Date/Time Dose & Interval Measured Level (mcg/mL) Associated AUC/OWEN    @ 1736 2500mg x 1, then 1500mg q24h 11.2 357    @ 1814 1750mg q12h  14.4 555 (SS)        Assessment/Plan:   Pt admitted d/t R foot pain with likely diabetic foot infection. XR negative for findings of osteomyelitis, but CT or MRI would be more definitive to help rule out. MRSA nares screen was negative. Blood cultures growing 1 of 4 bottles + for E.faecalis. Clinically stable without fever of sepsis may point towards contaminant despite the fact it is not a skin  pathogen.  Renal function with some improvement since admission, but overall stable. Unsure of baseline renal function. Continue AUC guided dosing  Level resulted this evening at 14.4 ( predicted AUC SS of 555)  Next level scheduled for  @ 1800  BMP ordered through   Antimicrobial stop date 7 days per consult

## 2024-06-12 NOTE — PROGRESS NOTES
Physician Progress Note      PATIENT:               JUANCARLOS CAMEJO  CSN #:                  [id number]  :                       1935  ADMIT DATE:       2024 5:56 PM  DISCH DATE:  RESPONDING  PROVIDER #:        Rogelio Bermeo MD          QUERY TEXT:    Good afternoon.    Patient admitted with diabetic foot ulcer. Pt noted to have Type 2 diabetes in    H&P and Tyle 1 diabetes in IM Progress Notes dated -.    Please document in progress notes and discharge summary the type of DM:      The medical record reflects the following:    Risk Factors: 89 yr old male, DM, HTN    Clinical Indicators: from  H&P: \"Type 2 diabetes\"; from - IM   PN: \"Type 1 diabetes\"    Treatment: Metformin 500 mg po twice daily, Lantus insulin, Humalog Insulin      Thank you,  Argelia Terrazas RN, CDI  Options provided:  -- DM type I  -- DM type II  -- Other - I will add my own diagnosis  -- Disagree - Not applicable / Not valid  -- Disagree - Clinically unable to determine / Unknown  -- Refer to Clinical Documentation Reviewer    PROVIDER RESPONSE TEXT:    This patient has DM type II.    Query created by: Argelia Terrazas on 6/10/2024 2:49 PM      Electronically signed by:  Rogelio Bermeo MD 2024 10:46 AM

## 2024-06-12 NOTE — PROGRESS NOTES
4 Eyes Skin Assessment     NAME:  Marcus Zimmerman  YOB: 1935  MEDICAL RECORD NUMBER:  [id number]    The patient is being assessed for  Other Weekly Skin Assessment    I agree that at least one RN has performed a thorough Head to Toe Skin Assessment on the patient. ALL assessment sites listed below have been assessed.      Areas assessed by both nurses:    Head, Face, Ears, Shoulders, Back, Chest, Arms, Elbows, Hands, Sacrum. Buttock, Coccyx, Ischium, Legs. Feet and Heels, and Under Medical Devices         Does the Patient have a Wound? No noted wound(s)       Willis Prevention initiated by RN: Yes  Wound Care Orders initiated by RN: No    Pressure Injury (Stage 3,4, Unstageable, DTI, NWPT, and Complex wounds) if present, place Wound referral order by RN under : No    New Ostomies, if present place, Ostomy referral order under : No     Nurse 1 eSignature: Electronically signed by Flor Zaragoza RN on 6/12/24 at 4:04 AM EDT    **SHARE this note so that the co-signing nurse can place an eSignature**    Nurse 2 eSignature: Electronically signed by Kaylee Rodríguez RN on 6/12/24 at 4:59 AM EDT

## 2024-06-12 NOTE — PROGRESS NOTES
OT eval order received and acknowledged. OT eval attempted at 838 however awaiting possible surgical plan, pt ambulating IND in room. Will continue to follow patient and attempt OT eval at a later time. Thank you.

## 2024-06-12 NOTE — PLAN OF CARE
Problem: Safety - Adult  Goal: Free from fall injury  6/12/2024 0744 by Karla Bennett LPN  Outcome: Progressing  6/11/2024 2332 by Flor Zaragoza, RN  Outcome: Progressing     Problem: Pain  Goal: Verbalizes/displays adequate comfort level or baseline comfort level  6/12/2024 0744 by Karla Bennett LPN  Outcome: Progressing  6/11/2024 2332 by Flor Zaragoza, RN  Outcome: Progressing

## 2024-06-13 ENCOUNTER — ANESTHESIA EVENT (OUTPATIENT)
Facility: HOSPITAL | Age: 89
End: 2024-06-13
Payer: MEDICARE

## 2024-06-13 ENCOUNTER — ANESTHESIA (OUTPATIENT)
Facility: HOSPITAL | Age: 89
End: 2024-06-13
Payer: MEDICARE

## 2024-06-13 PROBLEM — I73.9 PAD (PERIPHERAL ARTERY DISEASE) (HCC): Status: ACTIVE | Noted: 2024-06-13

## 2024-06-13 PROBLEM — L97.519 DIABETIC ULCER OF TOE OF RIGHT FOOT ASSOCIATED WITH TYPE 1 DIABETES MELLITUS (HCC): Status: ACTIVE | Noted: 2024-06-13

## 2024-06-13 PROBLEM — E10.42 TYPE 1 DIABETES MELLITUS WITH DIABETIC POLYNEUROPATHY (HCC): Status: ACTIVE | Noted: 2024-06-13

## 2024-06-13 PROBLEM — E10.621 DIABETIC ULCER OF TOE OF RIGHT FOOT ASSOCIATED WITH TYPE 1 DIABETES MELLITUS (HCC): Status: ACTIVE | Noted: 2024-06-13

## 2024-06-13 LAB
BACTERIA SPEC CULT: NORMAL
ECHO BSA: 2.5 M2
GLUCOSE BLD STRIP.AUTO-MCNC: 124 MG/DL (ref 65–100)
GLUCOSE BLD STRIP.AUTO-MCNC: 128 MG/DL (ref 65–100)
GLUCOSE BLD STRIP.AUTO-MCNC: 189 MG/DL (ref 65–100)
GLUCOSE BLD STRIP.AUTO-MCNC: 200 MG/DL (ref 65–100)
GLUCOSE BLD STRIP.AUTO-MCNC: 216 MG/DL (ref 65–100)
Lab: NORMAL
PERFORMED BY:: ABNORMAL
VAS LEFT ABI: 0.97
VAS LEFT ARM BP: 197 MMHG
VAS LEFT DORSALIS PEDIS BP: 192 MMHG
VAS LEFT PTA BP: 178 MMHG
VAS LEFT TBI: 0.44
VAS LEFT TOE PRESSURE: 87 MMHG
VAS RIGHT ABI: 0.98
VAS RIGHT ARM BP: 190 MMHG
VAS RIGHT DORSALIS PEDIS BP: 186 MMHG
VAS RIGHT PTA BP: 193 MMHG
VAS RIGHT TBI: 0.44
VAS RIGHT TOE PRESSURE: 87 MMHG

## 2024-06-13 PROCEDURE — 0Y6R0Z0 DETACHMENT AT RIGHT 2ND TOE, COMPLETE, OPEN APPROACH: ICD-10-PCS | Performed by: PODIATRIST

## 2024-06-13 PROCEDURE — 6370000000 HC RX 637 (ALT 250 FOR IP): Performed by: PODIATRIST

## 2024-06-13 PROCEDURE — 99233 SBSQ HOSP IP/OBS HIGH 50: CPT | Performed by: PODIATRIST

## 2024-06-13 PROCEDURE — 6370000000 HC RX 637 (ALT 250 FOR IP): Performed by: INTERNAL MEDICINE

## 2024-06-13 PROCEDURE — 6370000000 HC RX 637 (ALT 250 FOR IP): Performed by: FAMILY MEDICINE

## 2024-06-13 PROCEDURE — 3600000012 HC SURGERY LEVEL 2 ADDTL 15MIN: Performed by: PODIATRIST

## 2024-06-13 PROCEDURE — 2500000003 HC RX 250 WO HCPCS: Performed by: PODIATRIST

## 2024-06-13 PROCEDURE — 97161 PT EVAL LOW COMPLEX 20 MIN: CPT

## 2024-06-13 PROCEDURE — 7100000010 HC PHASE II RECOVERY - FIRST 15 MIN: Performed by: PODIATRIST

## 2024-06-13 PROCEDURE — 3600000002 HC SURGERY LEVEL 2 BASE: Performed by: PODIATRIST

## 2024-06-13 PROCEDURE — 1100000000 HC RM PRIVATE

## 2024-06-13 PROCEDURE — 97530 THERAPEUTIC ACTIVITIES: CPT

## 2024-06-13 PROCEDURE — 7100000011 HC PHASE II RECOVERY - ADDTL 15 MIN: Performed by: PODIATRIST

## 2024-06-13 PROCEDURE — 6360000002 HC RX W HCPCS: Performed by: FAMILY MEDICINE

## 2024-06-13 PROCEDURE — 2709999900 HC NON-CHARGEABLE SUPPLY: Performed by: PODIATRIST

## 2024-06-13 PROCEDURE — 93922 UPR/L XTREMITY ART 2 LEVELS: CPT | Performed by: SURGERY

## 2024-06-13 PROCEDURE — 2580000003 HC RX 258: Performed by: FAMILY MEDICINE

## 2024-06-13 PROCEDURE — 3700000001 HC ADD 15 MINUTES (ANESTHESIA): Performed by: PODIATRIST

## 2024-06-13 PROCEDURE — 82962 GLUCOSE BLOOD TEST: CPT

## 2024-06-13 PROCEDURE — 6360000002 HC RX W HCPCS: Performed by: NURSE ANESTHETIST, CERTIFIED REGISTERED

## 2024-06-13 PROCEDURE — 28820 AMPUTATION OF TOE: CPT | Performed by: PODIATRIST

## 2024-06-13 PROCEDURE — 3700000000 HC ANESTHESIA ATTENDED CARE: Performed by: PODIATRIST

## 2024-06-13 RX ORDER — ACETAMINOPHEN 650 MG
TABLET, EXTENDED RELEASE ORAL PRN
Status: DISCONTINUED | OUTPATIENT
Start: 2024-06-13 | End: 2024-06-13 | Stop reason: ALTCHOICE

## 2024-06-13 RX ORDER — SODIUM CHLORIDE, SODIUM LACTATE, POTASSIUM CHLORIDE, CALCIUM CHLORIDE 600; 310; 30; 20 MG/100ML; MG/100ML; MG/100ML; MG/100ML
INJECTION, SOLUTION INTRAVENOUS ONCE
Status: DISCONTINUED | OUTPATIENT
Start: 2024-06-13 | End: 2024-06-13 | Stop reason: HOSPADM

## 2024-06-13 RX ORDER — DIPHENHYDRAMINE HYDROCHLORIDE 50 MG/ML
12.5 INJECTION INTRAMUSCULAR; INTRAVENOUS
Status: DISCONTINUED | OUTPATIENT
Start: 2024-06-13 | End: 2024-06-13 | Stop reason: HOSPADM

## 2024-06-13 RX ORDER — HYDROMORPHONE HYDROCHLORIDE 1 MG/ML
0.5 INJECTION, SOLUTION INTRAMUSCULAR; INTRAVENOUS; SUBCUTANEOUS EVERY 5 MIN PRN
Status: DISCONTINUED | OUTPATIENT
Start: 2024-06-13 | End: 2024-06-13 | Stop reason: HOSPADM

## 2024-06-13 RX ORDER — SODIUM CHLORIDE 0.9 % (FLUSH) 0.9 %
5-40 SYRINGE (ML) INJECTION EVERY 12 HOURS SCHEDULED
Status: DISCONTINUED | OUTPATIENT
Start: 2024-06-13 | End: 2024-06-13 | Stop reason: HOSPADM

## 2024-06-13 RX ORDER — OXYCODONE HYDROCHLORIDE 5 MG/1
5 TABLET ORAL PRN
Status: DISCONTINUED | OUTPATIENT
Start: 2024-06-13 | End: 2024-06-13 | Stop reason: HOSPADM

## 2024-06-13 RX ORDER — FENTANYL CITRATE 50 UG/ML
50 INJECTION, SOLUTION INTRAMUSCULAR; INTRAVENOUS EVERY 5 MIN PRN
Status: DISCONTINUED | OUTPATIENT
Start: 2024-06-13 | End: 2024-06-13 | Stop reason: HOSPADM

## 2024-06-13 RX ORDER — DEXTROSE MONOHYDRATE 100 MG/ML
INJECTION, SOLUTION INTRAVENOUS CONTINUOUS PRN
Status: DISCONTINUED | OUTPATIENT
Start: 2024-06-13 | End: 2024-06-13 | Stop reason: HOSPADM

## 2024-06-13 RX ORDER — NALOXONE HYDROCHLORIDE 0.4 MG/ML
INJECTION, SOLUTION INTRAMUSCULAR; INTRAVENOUS; SUBCUTANEOUS PRN
Status: DISCONTINUED | OUTPATIENT
Start: 2024-06-13 | End: 2024-06-13 | Stop reason: HOSPADM

## 2024-06-13 RX ORDER — LIDOCAINE 4 G/G
1 PATCH TOPICAL AS NEEDED
Status: DISCONTINUED | OUTPATIENT
Start: 2024-06-13 | End: 2024-06-13 | Stop reason: HOSPADM

## 2024-06-13 RX ORDER — ONDANSETRON 2 MG/ML
4 INJECTION INTRAMUSCULAR; INTRAVENOUS
Status: DISCONTINUED | OUTPATIENT
Start: 2024-06-13 | End: 2024-06-13 | Stop reason: HOSPADM

## 2024-06-13 RX ORDER — SODIUM CHLORIDE 9 MG/ML
INJECTION, SOLUTION INTRAVENOUS PRN
Status: DISCONTINUED | OUTPATIENT
Start: 2024-06-13 | End: 2024-06-13 | Stop reason: HOSPADM

## 2024-06-13 RX ORDER — IPRATROPIUM BROMIDE AND ALBUTEROL SULFATE 2.5; .5 MG/3ML; MG/3ML
1 SOLUTION RESPIRATORY (INHALATION)
Status: DISCONTINUED | OUTPATIENT
Start: 2024-06-13 | End: 2024-06-13 | Stop reason: HOSPADM

## 2024-06-13 RX ORDER — GLUCAGON 1 MG/ML
1 KIT INJECTION PRN
Status: DISCONTINUED | OUTPATIENT
Start: 2024-06-13 | End: 2024-06-13 | Stop reason: HOSPADM

## 2024-06-13 RX ORDER — LORAZEPAM 2 MG/ML
0.5 INJECTION INTRAMUSCULAR
Status: DISCONTINUED | OUTPATIENT
Start: 2024-06-13 | End: 2024-06-13 | Stop reason: HOSPADM

## 2024-06-13 RX ORDER — MEPERIDINE HYDROCHLORIDE 25 MG/ML
12.5 INJECTION INTRAMUSCULAR; INTRAVENOUS; SUBCUTANEOUS EVERY 5 MIN PRN
Status: DISCONTINUED | OUTPATIENT
Start: 2024-06-13 | End: 2024-06-13 | Stop reason: HOSPADM

## 2024-06-13 RX ORDER — PROPOFOL 10 MG/ML
INJECTION, EMULSION INTRAVENOUS PRN
Status: DISCONTINUED | OUTPATIENT
Start: 2024-06-13 | End: 2024-06-13 | Stop reason: SDUPTHER

## 2024-06-13 RX ORDER — SODIUM CHLORIDE 0.9 % (FLUSH) 0.9 %
5-40 SYRINGE (ML) INJECTION PRN
Status: DISCONTINUED | OUTPATIENT
Start: 2024-06-13 | End: 2024-06-13 | Stop reason: HOSPADM

## 2024-06-13 RX ORDER — LABETALOL HYDROCHLORIDE 5 MG/ML
10 INJECTION, SOLUTION INTRAVENOUS
Status: DISCONTINUED | OUTPATIENT
Start: 2024-06-13 | End: 2024-06-13 | Stop reason: HOSPADM

## 2024-06-13 RX ORDER — DEXAMETHASONE SODIUM PHOSPHATE 4 MG/ML
INJECTION, SOLUTION INTRA-ARTICULAR; INTRALESIONAL; INTRAMUSCULAR; INTRAVENOUS; SOFT TISSUE PRN
Status: DISCONTINUED | OUTPATIENT
Start: 2024-06-13 | End: 2024-06-13 | Stop reason: SDUPTHER

## 2024-06-13 RX ORDER — ONDANSETRON 2 MG/ML
INJECTION INTRAMUSCULAR; INTRAVENOUS PRN
Status: DISCONTINUED | OUTPATIENT
Start: 2024-06-13 | End: 2024-06-13 | Stop reason: SDUPTHER

## 2024-06-13 RX ORDER — FENTANYL CITRATE 50 UG/ML
INJECTION, SOLUTION INTRAMUSCULAR; INTRAVENOUS PRN
Status: DISCONTINUED | OUTPATIENT
Start: 2024-06-13 | End: 2024-06-13 | Stop reason: SDUPTHER

## 2024-06-13 RX ORDER — METOCLOPRAMIDE HYDROCHLORIDE 5 MG/ML
10 INJECTION INTRAMUSCULAR; INTRAVENOUS
Status: DISCONTINUED | OUTPATIENT
Start: 2024-06-13 | End: 2024-06-13 | Stop reason: HOSPADM

## 2024-06-13 RX ORDER — LIDOCAINE HYDROCHLORIDE 10 MG/ML
INJECTION, SOLUTION INFILTRATION; PERINEURAL PRN
Status: DISCONTINUED | OUTPATIENT
Start: 2024-06-13 | End: 2024-06-13 | Stop reason: ALTCHOICE

## 2024-06-13 RX ORDER — OXYCODONE HYDROCHLORIDE AND ACETAMINOPHEN 5; 325 MG/1; MG/1
1 TABLET ORAL EVERY 4 HOURS PRN
Status: DISCONTINUED | OUTPATIENT
Start: 2024-06-13 | End: 2024-06-14 | Stop reason: HOSPADM

## 2024-06-13 RX ORDER — AMOXICILLIN AND CLAVULANATE POTASSIUM 875; 125 MG/1; MG/1
1 TABLET, FILM COATED ORAL EVERY 12 HOURS SCHEDULED
Status: DISCONTINUED | OUTPATIENT
Start: 2024-06-13 | End: 2024-06-14 | Stop reason: HOSPADM

## 2024-06-13 RX ORDER — OXYCODONE HYDROCHLORIDE 5 MG/1
10 TABLET ORAL PRN
Status: DISCONTINUED | OUTPATIENT
Start: 2024-06-13 | End: 2024-06-13 | Stop reason: HOSPADM

## 2024-06-13 RX ORDER — HYDRALAZINE HYDROCHLORIDE 20 MG/ML
10 INJECTION INTRAMUSCULAR; INTRAVENOUS
Status: DISCONTINUED | OUTPATIENT
Start: 2024-06-13 | End: 2024-06-13 | Stop reason: HOSPADM

## 2024-06-13 RX ADMIN — PIPERACILLIN AND TAZOBACTAM 3375 MG: 3; .375 INJECTION, POWDER, LYOPHILIZED, FOR SOLUTION INTRAVENOUS at 01:43

## 2024-06-13 RX ADMIN — METFORMIN HYDROCHLORIDE 500 MG: 500 TABLET ORAL at 09:09

## 2024-06-13 RX ADMIN — SODIUM CHLORIDE, PRESERVATIVE FREE 10 ML: 5 INJECTION INTRAVENOUS at 20:00

## 2024-06-13 RX ADMIN — INSULIN GLARGINE 20 UNITS: 100 INJECTION, SOLUTION SUBCUTANEOUS at 20:01

## 2024-06-13 RX ADMIN — AMLODIPINE BESYLATE 10 MG: 5 TABLET ORAL at 09:09

## 2024-06-13 RX ADMIN — OXYCODONE HYDROCHLORIDE AND ACETAMINOPHEN 1 TABLET: 5; 325 TABLET ORAL at 16:56

## 2024-06-13 RX ADMIN — OXYCODONE HYDROCHLORIDE AND ACETAMINOPHEN 1 TABLET: 5; 325 TABLET ORAL at 13:11

## 2024-06-13 RX ADMIN — ONDANSETRON 4 MG: 2 INJECTION INTRAMUSCULAR; INTRAVENOUS at 07:58

## 2024-06-13 RX ADMIN — PROPOFOL 100 MG: 10 INJECTION, EMULSION INTRAVENOUS at 07:55

## 2024-06-13 RX ADMIN — INSULIN LISPRO 2 UNITS: 100 INJECTION, SOLUTION INTRAVENOUS; SUBCUTANEOUS at 13:10

## 2024-06-13 RX ADMIN — OXYCODONE HYDROCHLORIDE AND ACETAMINOPHEN 1 TABLET: 5; 325 TABLET ORAL at 09:37

## 2024-06-13 RX ADMIN — TIMOLOL MALEATE 1 DROP: 5 SOLUTION OPHTHALMIC at 09:10

## 2024-06-13 RX ADMIN — ENOXAPARIN SODIUM 30 MG: 100 INJECTION SUBCUTANEOUS at 20:00

## 2024-06-13 RX ADMIN — DEXAMETHASONE SODIUM PHOSPHATE 4 MG: 4 INJECTION, SOLUTION INTRA-ARTICULAR; INTRALESIONAL; INTRAMUSCULAR; INTRAVENOUS; SOFT TISSUE at 07:58

## 2024-06-13 RX ADMIN — METFORMIN HYDROCHLORIDE 500 MG: 500 TABLET ORAL at 16:56

## 2024-06-13 RX ADMIN — PIPERACILLIN AND TAZOBACTAM 3375 MG: 3; .375 INJECTION, POWDER, LYOPHILIZED, FOR SOLUTION INTRAVENOUS at 09:37

## 2024-06-13 RX ADMIN — HYDROCHLOROTHIAZIDE 12.5 MG: 25 TABLET ORAL at 09:09

## 2024-06-13 RX ADMIN — ASPIRIN 81 MG: 81 TABLET, COATED ORAL at 09:09

## 2024-06-13 RX ADMIN — FENTANYL CITRATE 50 MCG: 50 INJECTION, SOLUTION INTRAMUSCULAR; INTRAVENOUS at 07:53

## 2024-06-13 RX ADMIN — Medication 1000 UNITS: at 09:09

## 2024-06-13 RX ADMIN — POLYETHYLENE GLYCOL 3350 17 G: 17 POWDER, FOR SOLUTION ORAL at 16:56

## 2024-06-13 RX ADMIN — LOSARTAN POTASSIUM 100 MG: 50 TABLET, FILM COATED ORAL at 09:09

## 2024-06-13 RX ADMIN — FENTANYL CITRATE 50 MCG: 50 INJECTION, SOLUTION INTRAMUSCULAR; INTRAVENOUS at 07:57

## 2024-06-13 RX ADMIN — ENOXAPARIN SODIUM 30 MG: 100 INJECTION SUBCUTANEOUS at 09:08

## 2024-06-13 RX ADMIN — AMOXICILLIN AND CLAVULANATE POTASSIUM 1 TABLET: 875; 125 TABLET, FILM COATED ORAL at 20:00

## 2024-06-13 RX ADMIN — PROPOFOL 50 MG: 10 INJECTION, EMULSION INTRAVENOUS at 08:03

## 2024-06-13 ASSESSMENT — PAIN SCALES - GENERAL
PAINLEVEL_OUTOF10: 4
PAINLEVEL_OUTOF10: 7
PAINLEVEL_OUTOF10: 0
PAINLEVEL_OUTOF10: 6

## 2024-06-13 ASSESSMENT — PAIN DESCRIPTION - LOCATION: LOCATION: FOOT

## 2024-06-13 NOTE — PROGRESS NOTES
Hospitalist Progress Note    NAME:   Marcus Zimmerman   : 1935   MRN: [id number]     Date/Time: 2024 2:08 PM  Patient PCP: Joel Bauer MD    Estimated discharge date:  Barriers:       Assessment / Plan:    Diabetic right second toe wound  No radiologic concern for osteomyelitis on x-ray initial evaluation.  On Vanco/Zosyn day changed to Augmentin.   MRI of the foot ordered pending results for surgical planning.   S/p amputation of second right toe .  -Anticipate discharge in next 24 hours.    Type 2 diabetes.  A1c 8.4  Patient at home on Humalog 70/30 twice daily.  Currently placed on Lantus 20 units daily.  Continue on Accu-Cheks.    Enterococcus bacteremia/contamination  1 out of 4 blood cultures positive for GPC/Enterococcus which is likely contamination.  No fevers, sepsis, SIRS or any other sequelae of severe infection noted.    Essential hypertension  Continue home meds.  Started on amlodipine given elevated BP on home regime.     DVT prophylaxis  Lovenox SC    Medical Decision Making:   I personally reviewed labs: CBC, BMP  I personally reviewed imaging:  I personally reviewed EKG:  Toxic drug monitoring:     Discussed case with: Patient, family, nursing    Code Status: Full code      Subjective:     Chief Complaint / Reason for Physician Visit  \" Right foot pain\".  Discussed with RN events overnight.      - Admission H&P  89 y.o. male with multiple medical problems including type 2 diabetes and hypertension who presents to the emergency department accompanied by his daughter for evaluation.  Patient has been in his usual state of health.  He noticed that there was an issue with the wound of the second toe of his right foot.  Apparently according to the history provided by the daughter he was soaking it in some sort of solution.  Unsure of how long it has been there but patient states it has been bothering him for more's for the last 3 days.  He denies any fever, chills, sweats.   147/63 97.8 °F (36.6 °C) Oral 81 16 96 %   06/13/24 0705 (!) 167/78 97.8 °F (36.6 °C) Oral 79 15 99 %   06/13/24 0145 138/78 98.1 °F (36.7 °C) Oral 76 16 99 %   06/12/24 1945 (!) 162/81 98.1 °F (36.7 °C) Oral 81 -- 100 %   06/12/24 1429 (!) 154/75 98.4 °F (36.9 °C) Oral 79 -- 100 %         Intake/Output Summary (Last 24 hours) at 6/13/2024 1408  Last data filed at 6/13/2024 0436  Gross per 24 hour   Intake --   Output 500 ml   Net -500 ml          I had a face to face encounter and independently examined this patient on 6/13/2024, as outlined below:  PHYSICAL EXAM:  General: Alert, cooperative  EENT:  EOMI. Anicteric sclerae.  Resp:  CTA bilaterally, no wheezing or rales.  No accessory muscle use  CV:  Regular  rhythm,  No edema  GI:  Soft, Non distended, Non tender.  +Bowel sounds  Neurologic:  Alert and oriented X 3, normal speech,   Psych:   Good insight. Not anxious nor agitated  Skin:  No rashes.  No jaundice    Reviewed most current lab test results and cultures  YES  Reviewed most current radiology test results   YES  Review and summation of old records today    NO  Reviewed patient's current orders and MAR    YES  PMH/SH reviewed - no change compared to H&P    Procedures: see electronic medical records for all procedures/Xrays and details which were not copied into this note but were reviewed prior to creation of Plan.      LABS:  I reviewed today's most current labs and imaging studies.  Pertinent labs include:  Recent Labs     06/11/24  0846   WBC 7.8   HGB 12.1   HCT 37.2        Recent Labs     06/11/24  0846 06/12/24  0745    140   K 3.5 3.5    106   CO2 25 28   GLUCOSE 165* 143*   BUN 10 10   CREATININE 1.10 1.08   CALCIUM 9.1 8.9       Signed: Rogelio Ventura MD

## 2024-06-13 NOTE — ANESTHESIA POSTPROCEDURE EVALUATION
Department of Anesthesiology  Postprocedure Note    Patient: Marcus Zimmerman  MRN: [id number]  YOB: 1935  Date of evaluation: 6/13/2024    Procedure Summary       Date: 06/13/24 Room / Location: [facility] MAIN OR 09 / [facility] MAIN OR    Anesthesia Start: 0753 Anesthesia Stop: 0817    Procedure: RIGHT SECOND TOE AMPUTATION (Right: Foot) Diagnosis:       Diabetic foot ulcer with osteomyelitis (HCC)      (Diabetic foot ulcer with osteomyelitis (HCC) [E11.621, E11.69, L97.509, M86.9])    Surgeons: Justin Dillon DPM Responsible Provider: Dileep Pritchett MD    Anesthesia Type: MAC ASA Status: 3            Anesthesia Type: MAC    Armand Phase I: Armand Score: 10    Armand Phase II: Armand Score: 10    Anesthesia Post Evaluation    Patient location during evaluation: PACU  Patient participation: complete - patient participated  Level of consciousness: sleepy but conscious  Pain score: 0  Airway patency: patent  Nausea & Vomiting: no nausea and no vomiting  Cardiovascular status: hemodynamically stable  Respiratory status: acceptable  Hydration status: stable  Multimodal analgesia pain management approach    No notable events documented.

## 2024-06-13 NOTE — PROGRESS NOTES
OT eval order received and acknowledged. Per PT, pt demonstrating baseline independence for self care tasks and functional mobility/transfers. Pt reports no need for skilled OT services at this time. OT evaluation order will therefore be discontinued this pt has no acute OT needs. Please reorder OT if pt's functional status changes. Thank you.         Functional Measure:  [facility] AM-PACTM \"6 Clicks\"                                                       Daily Activity Inpatient Short Form  How much help from another person does the patient currently need... Total; A Lot A Little None   1.  Putting on and taking off regular lower body clothing? []  1 []  2 []  3 [x]  4   2.  Bathing (including washing, rinsing, drying)? []  1 []  2 []  3 [x]  4   3.  Toileting, which includes using toilet, bedpan or urinal? [] 1 []  2 []  3 [x]  4   4.  Putting on and taking off regular upper body clothing? []  1 []  2 []  3 [x]  4   5.  Taking care of personal grooming such as brushing teeth? []  1 []  2 []  3 [x]  4   6.  Eating meals? []  1 []  2 []  3 [x]  4   © 2007, Trustees of [facility], under license to "[employer]. All rights reserved     Score: 24/24     Interpretation of Tool:  Represents clinically-significant functional categories (i.e. Activities of daily living).  Percentage of Impairment CH    0%   CI    1-19% CJ    20-39% CK    40-59% CL    60-79% CM    80-99% CN     100%   AMPA  Score 6-24 24 23 20-22 15-19 10-14 7-9 6

## 2024-06-13 NOTE — PLAN OF CARE
PHYSICAL THERAPY EVALUATION AND DISCHARGE  Patient: Marcus Zimmerman (89 y.o. male)  Date: 6/13/2024  Primary Diagnosis: Cellulitis and abscess of toe of right foot [L03.031, L02.611]  Procedure(s) (LRB):  RIGHT SECOND TOE AMPUTATION (Right) Day of Surgery   Precautions: Weight Bearing Right Lower Extremity Weight Bearing:  (heel touch WB)                    Recommendations for nursing mobility: Amb in hallway    In place during session: Peripheral IV and EKG/telemetry     ASSESSMENT  Pt is a 89 y.o. male admitted on 6/7/2024 for abscess rt foot toe; pt currently being treated for 2nd toe amp . Pt sitting at eob upon PT arrival, agreeable to evaluation. Pt A&O x 4.Daughter present.     Based on the objective data described below pt currently present at baseline sup for transfers and mobility at this time. Patient was given and donned  post op shoe for rt foot.Patient off balance upon standing and walking with cane at this time , so PT got RW and ambulated patient with RW in hallways.Patient with better balance with RW,should use the RW for now.Heel touch WB practiced.(See below for objective details and assist levels).     Overall pt tolerated session good today with PT.  Pt has no skilled acute PT needs at this time noted by PT or reported by pt, will DC skilled PT following evaluation; pt verbalized understanding and agreement. Potential barriers for safe discharge: . Current PT DC recommendation Home with Family Care once medically appropriate.         PLAN :  Recommendations and Planned Interventions: DC from skilled PT services following evaluation.     Rationale for discharge: Patient currently at functional baseline for transfers/mobility, no further skilled therapy required at this time    Frequency/Duration: DC from services following evaluation due to pt being at functional baseline; no skilled therapy required during admission, please reorder if needed     Recommendation for discharge: (in order

## 2024-06-13 NOTE — OP NOTE
[facility] PODIATRY & FOOT SURGERY    Operative Note      Patient: Marcus Zimmerman  YOB: 1935  MRN: [id number]    Date of Procedure:   06/13/2024    Pre-Op Diagnosis Codes:  Diabetic ulcer with osteomyelitis, right second toe    Post-Op Diagnosis:   Same       Procedure(s):  RIGHT SECOND TOE AMPUTATION    Surgeon(s):  Justin Dillon DPM    Assistant:   Surgical Assistant: Lima Bose    Anesthesia:   MAC    Estimated Blood Loss (mL):   Less than 50     Complications:   None    Specimens:   None    Implants:  None      Drains:   None    Findings:  Infection Present At Time Of Surgery (PATOS) (choose all levels that have infection present):  - Organ Space infection (below fascia) present as evidenced by osteomyelitis    Detailed Description of Procedure:   Pt was seen in the pre-operative holding area and all questions were answered and all concerns were addressed. The operative procedure was discussed in great detail, with all possible complications highlighted. Pt verbalized complete understanding and the consent was signed and witnessed. Pt was on scheduled abx, thus no additional abx ordered for surgical prophylaxis. The operative limb was marked and the pt was transported to the operating room. The pt was transferred to the operating table and anesthesia was administered as indicated above. The RLE was scrubbed and draped in sterile fashion. A time out was performed to confirm the correct pt, correct procedure, correct limb, abx, allergies, fire risk and attendees within the operating room. Upon completion, the procedure commenced.    With a 15 blade, a circumferential incision was made about the right second metatarsal phalangeal joint.  The right second toe was sharply disarticulated at the level of the joint.  A thorough flush was performed with a bulb syringe containing normal saline.  The remaining tissue was noted to be bleeding/granular.  The site was  close directly at skin with 3-0 Prolene.  Site was dressed with Betadine ointment, 4 x 4's, Kerlix and a light Ace wrap    Pt tolerated the above procedures well and all post operative counts were correct. Pt was transferred to PACU without incident. A thorough neurovascular check was then performed. Upon meeting transfer criteria, pt was transferred back to the medical floor.          Justin Dillon DPM, CWSP, AACFAS    [facility]  [address], [place] [address]  O: [phone]  F: [phone]    [facility]  [address]  [place] [address]  O: [phone]  F: [phone]    [facility]  [address]  [place] [address]  O: [phone]  F: [phone]    * Available via Zulu 24/7      Electronically signed by Justin Dillon DPM on 6/13/2024 at 8:17 AM

## 2024-06-13 NOTE — PROGRESS NOTES
warm from proximal to distal for B/L LE. Neg homas/amado signs to B/L LE. No varicosities or telangectasias noted to B/L LE.  NEURO: Protective and epicritic sensations grossly absent to B/L LE  MSK: (-) POP, No gross deformities. Good muscle tone and bulk noted to B/L LE.  PSYCH: Cooperative with normal mood and affect       Labs/Imaging/Diagnostics    Labs:  CBC:  Recent Labs     06/11/24  0846   WBC 7.8   RBC 4.06*   HGB 12.1   HCT 37.2   MCV 91.6   RDW 12.7        CHEMISTRIES:  Recent Labs     06/11/24  0846 06/12/24  0745    140   K 3.5 3.5    106   CO2 25 28   BUN 10 10   CREATININE 1.10 1.08   GLUCOSE 165* 143*     PT/INR:No results for input(s): \"PROTIME\", \"INR\" in the last 72 hours.  APTT:No results for input(s): \"APTT\" in the last 72 hours.  LIVER PROFILE:No results for input(s): \"AST\", \"ALT\", \"BILIDIR\", \"BILITOT\", \"ALKPHOS\" in the last 72 hours.    Imaging Last 24 Hours:  Vascular ankle brachial index (EMRCED)    Result Date: 6/13/2024    Right side findings: Resting MERCED is 0.98. Resting TBI is 0.44.   Left side findings: Resting MERCED is 0.97. Resting TBI is 0.44. This is vascular lab report on Marcus Zimmerman, patient's YOB: 1935. Date of examination: June 12, 2024. Examination: Vascular ankle-brachial index. Technician: Mr. Ant Covarrubias. Clinical history: Patient is 89 years old man with right foot cellulitis abscess. Indication: Ulcer patient. Technique: Bilateral lower leg distal arterial system examined using physiologic testing with Doppler interrogation, segmental pressure. Findings: Doppler waveform shows: Right PT monophasic signal Right DP monophasic signal Right great toe PPG shows loss reflective wave Right second toe PPG shows loss reflective wave Left PT monophasic Left DP monophasic signal Left great toe PPG shows loss reflective wave Segmental pressure shows: Right arm 190 Left arm 197 Right  Right  Right great toe 87 Left  Left   Left great toe 87 Impression: 1.  Right ankle-brachial index is 0.98, normal 2   Left ankle-brachial index is 0.98, normal 3.  Right TBI is 0.44, moderate peripheral artery disease 4.  Left TBI is 0.44, moderate peripheral disease. Findings suggest microvascular angiopathic condition bilateral pedal level.       Assessment//Plan           Hospital Problems             Last Modified POA    * (Principal) Cellulitis and abscess of toe of right foot 6/7/2024 Yes     Assessment & Plan    Diabetic ulcer, right second toe  Uncontrolled DM T1 with neuropathy  PAD        Patient was seen and evaluated at bedside.  - Current labs personally reviewed and findings dicussed with patient  - Non invasive vascular studies personally reviewed and findings discussed with pt  - Pending MRI results for surgical plaining, but due to delay in reporting will need to move forward with the surgery  - Again, tx options reviewed, conservative vs surgical. Possible complications discussed. Pt has elected for surgical intervention. In depth convo had regarding preop, intraop and postop surgical protocols. NPO after and consent to be signed/witnessed. Plan for a complete right second toe amputation  - Rec leaving open to air at this time.  Offload while in bed.  Heel touch to the right otherwise  - Cont empiric abx  - We we will follow closely     Thank you!              Justin Dillon, CAMILLE, CWSP, AACFAS    [facility]  [address], [place] [address]  O: [phone]  F: [phone]     [facility]  [address]  [place] [address]  O: [phone]  F: [phone]     [facility]  [address]  [place] [address]  O: [phone]  F: [phone]     * Available via alike 24/7      Electronically signed by

## 2024-06-13 NOTE — ANESTHESIA PRE PROCEDURE
Department of Anesthesiology  Preprocedure Note       Name:  Marcus Zimmerman   Age:  89 y.o.  :  1935                                          MRN:  [id number]         Date:  2024      Surgeon: Surgeon(s):  Justin Dillon DPM    Procedure: Procedure(s):  RIGHT SECOND TOE AMPUTATION    Medications prior to admission:   Prior to Admission medications    Medication Sig Start Date End Date Taking? Authorizing Provider   brimonidine (ALPHAGAN P) 0.1 % SOLN Place 1 drop into both eyes in the morning and at bedtime   Yes Provider, MD Jsesica   dorzolamide-timolol (COSOPT) 2-0.5 % ophthalmic solution Place 1 drop into both eyes 2 times daily   Yes Provider, MD Jessica   aspirin 81 MG EC tablet Take 81 mg by mouth daily  Patient not taking: Reported on 2024    Automatic Reconciliation, Ar   atenolol (TENORMIN) 100 MG tablet Take 100 mg by mouth daily  Patient not taking: Reported on 2024    Automatic Reconciliation, Ar   vitamin D 25 MCG (1000 UT) CAPS Take by mouth daily  Patient not taking: Reported on 2024    Automatic Reconciliation, Ar   insulin 70-30 (HUMULIN;NOVOLIN) (70-30) 100 UNIT per ML injection vial Inject into the skin once    Automatic Reconciliation, Ar   losartan (COZAAR) 50 MG tablet ceived the following from [facility]: Outside name: losartan (COZAAR) 50 mg tablet  Patient not taking: Reported on 2024   Automatic Reconciliation, Ar   losartan-hydroCHLOROthiazide (HYZAAR) 100-12.5 MG per tablet Take 1 tablet by mouth daily    Automatic Reconciliation, Ar   metFORMIN (GLUCOPHAGE) 500 MG tablet Take 1 tablet by mouth 2 times daily (with meals)    Automatic Reconciliation, Ar   Netarsudil Dimesylate (RHOPRESSA) 0.02 % SOLN 1 drop 21   Automatic Reconciliation, Ar   Timolol (TIMOPTIC) 0.5 % SOLN ophthalmic solution Apply 1 drop to eye daily    Automatic Reconciliation, Ar       Current medications:    Current Facility-Administered

## 2024-06-14 VITALS
SYSTOLIC BLOOD PRESSURE: 140 MMHG | HEART RATE: 89 BPM | OXYGEN SATURATION: 99 % | DIASTOLIC BLOOD PRESSURE: 82 MMHG | RESPIRATION RATE: 17 BRPM | TEMPERATURE: 97.5 F | HEIGHT: 73 IN | WEIGHT: 268 LBS | BODY MASS INDEX: 35.52 KG/M2

## 2024-06-14 LAB
ANION GAP SERPL CALC-SCNC: 6 MMOL/L (ref 5–15)
BUN SERPL-MCNC: 14 MG/DL (ref 6–20)
BUN/CREAT SERPL: 13 (ref 12–20)
CA-I BLD-MCNC: 8.9 MG/DL (ref 8.5–10.1)
CHLORIDE SERPL-SCNC: 103 MMOL/L (ref 97–108)
CO2 SERPL-SCNC: 29 MMOL/L (ref 21–32)
CREAT SERPL-MCNC: 1.1 MG/DL (ref 0.7–1.3)
GLUCOSE BLD STRIP.AUTO-MCNC: 236 MG/DL (ref 65–100)
GLUCOSE SERPL-MCNC: 259 MG/DL (ref 65–100)
PERFORMED BY:: ABNORMAL
POTASSIUM SERPL-SCNC: 3.3 MMOL/L (ref 3.5–5.1)
SODIUM SERPL-SCNC: 138 MMOL/L (ref 136–145)

## 2024-06-14 PROCEDURE — 6370000000 HC RX 637 (ALT 250 FOR IP): Performed by: INTERNAL MEDICINE

## 2024-06-14 PROCEDURE — 2580000003 HC RX 258: Performed by: FAMILY MEDICINE

## 2024-06-14 PROCEDURE — 6370000000 HC RX 637 (ALT 250 FOR IP): Performed by: FAMILY MEDICINE

## 2024-06-14 PROCEDURE — 80048 BASIC METABOLIC PNL TOTAL CA: CPT

## 2024-06-14 PROCEDURE — 6370000000 HC RX 637 (ALT 250 FOR IP): Performed by: PODIATRIST

## 2024-06-14 PROCEDURE — 82962 GLUCOSE BLOOD TEST: CPT

## 2024-06-14 PROCEDURE — 36415 COLL VENOUS BLD VENIPUNCTURE: CPT

## 2024-06-14 PROCEDURE — 6360000002 HC RX W HCPCS: Performed by: FAMILY MEDICINE

## 2024-06-14 RX ORDER — AMLODIPINE BESYLATE 10 MG/1
10 TABLET ORAL DAILY
Qty: 30 TABLET | Refills: 3 | Status: SHIPPED | OUTPATIENT
Start: 2024-06-14

## 2024-06-14 RX ORDER — AMOXICILLIN AND CLAVULANATE POTASSIUM 875; 125 MG/1; MG/1
1 TABLET, FILM COATED ORAL EVERY 12 HOURS SCHEDULED
Qty: 13 TABLET | Refills: 0 | Status: SHIPPED | OUTPATIENT
Start: 2024-06-14 | End: 2024-06-21

## 2024-06-14 RX ADMIN — HYDROCHLOROTHIAZIDE 12.5 MG: 25 TABLET ORAL at 09:32

## 2024-06-14 RX ADMIN — METFORMIN HYDROCHLORIDE 500 MG: 500 TABLET ORAL at 09:32

## 2024-06-14 RX ADMIN — LOSARTAN POTASSIUM 100 MG: 50 TABLET, FILM COATED ORAL at 09:32

## 2024-06-14 RX ADMIN — SODIUM CHLORIDE, PRESERVATIVE FREE 10 ML: 5 INJECTION INTRAVENOUS at 09:33

## 2024-06-14 RX ADMIN — AMLODIPINE BESYLATE 10 MG: 5 TABLET ORAL at 09:32

## 2024-06-14 RX ADMIN — OXYCODONE HYDROCHLORIDE AND ACETAMINOPHEN 1 TABLET: 5; 325 TABLET ORAL at 04:15

## 2024-06-14 RX ADMIN — Medication 1000 UNITS: at 09:32

## 2024-06-14 RX ADMIN — ENOXAPARIN SODIUM 30 MG: 100 INJECTION SUBCUTANEOUS at 09:33

## 2024-06-14 RX ADMIN — AMOXICILLIN AND CLAVULANATE POTASSIUM 1 TABLET: 875; 125 TABLET, FILM COATED ORAL at 09:32

## 2024-06-14 RX ADMIN — ASPIRIN 81 MG: 81 TABLET, COATED ORAL at 09:32

## 2024-06-14 RX ADMIN — INSULIN LISPRO 4 UNITS: 100 INJECTION, SOLUTION INTRAVENOUS; SUBCUTANEOUS at 09:32

## 2024-06-14 RX ADMIN — TIMOLOL MALEATE 1 DROP: 5 SOLUTION OPHTHALMIC at 09:34

## 2024-06-14 ASSESSMENT — PAIN - FUNCTIONAL ASSESSMENT: PAIN_FUNCTIONAL_ASSESSMENT: ACTIVITIES ARE NOT PREVENTED

## 2024-06-14 ASSESSMENT — PAIN SCALES - GENERAL
PAINLEVEL_OUTOF10: 2
PAINLEVEL_OUTOF10: 0
PAINLEVEL_OUTOF10: 6

## 2024-06-14 ASSESSMENT — PAIN DESCRIPTION - DESCRIPTORS: DESCRIPTORS: ACHING

## 2024-06-14 ASSESSMENT — PAIN DESCRIPTION - LOCATION: LOCATION: FOOT

## 2024-06-14 ASSESSMENT — PAIN DESCRIPTION - ORIENTATION: ORIENTATION: RIGHT

## 2024-06-14 NOTE — PROGRESS NOTES
Comprehensive Nutrition Assessment    Type and Reason for Visit:  Initial, Positive Nutrition Screen, Wound (LOS)    Nutrition Recommendations/Plan:   Continue diet as ordered after d/c  Consider referral to OP DM education     Malnutrition Assessment:  Malnutrition Status:  No malnutrition (06/14/24 0959)    Context:  Acute Illness     Findings of the 6 clinical characteristics of malnutrition:  Energy Intake:  No significant decrease in energy intake  Weight Loss:  No significant weight loss     Body Fat Loss:  No significant body fat loss     Muscle Mass Loss:  No significant muscle mass loss    Fluid Accumulation:  Unable to assess     Strength:  Not Performed    Nutrition Assessment:    Pt seen today per LOS Positive dietitian screening. Noted pt is d/c'd but still stopped by to see patient to see if patient had questions re: DM & Diet. Labs and meds reviewed. A1c 8.4%.POC Glu 236,216,189,200,128,124mg/dl. Encouraged pt to work on diet at home. balance CHO.Daughter present in room - very supportive. . Referred pt to OP DM education as well.    Nutrition Related Findings:    NFPE deferred. Wound Type: Surgical Incision       Current Nutrition Intake & Therapies:    Average Meal Intake: Unable to assess  Average Supplements Intake: None Ordered  ADULT DIET; Regular; 4 carb choices (60 gm/meal)    Anthropometric Measures:  Height: 185.4 cm (6' 0.99\")  Ideal Body Weight (IBW): 184 lbs (84 kg)       Current Body Weight: 121.6 kg (268 lb 1.3 oz), 145.7 % IBW. Weight Source: Stated  Current BMI (kg/m2): 35.4        Weight Adjustment For: No Adjustment      Weight History    Weight History Weight - Scale Weight - Scale Weight Method               6/7/2024 268 lbs 121.6 kg Stated   6/7/2022 260 lbs 117.9 kg         BMI Categories: Obese Class 2 (BMI 35.0 -39.9)    Estimated Daily Nutrient Needs:  Energy Requirements Based On: Kcal/kg  Weight Used for Energy Requirements: Adjusted  Energy (kcal/day): 2352-0649  kcals/d (~25kcals/kg)  Weight Used for Protein Requirements: Adjusted  Protein (g/day): 111 gm/d (1.2gm/kg) healing  Method Used for Fluid Requirements: 1 ml/kcal  Fluid (ml/day): 2000cc (1cc/kcal)    Nutrition Diagnosis:   Overweight/Obese related to other (comment) as evidenced by wounds, BMI    Nutrition Interventions:   Food and/or Nutrient Delivery: Continue Current Diet  Nutrition Education/Counseling: Education initiated (Worked with pt today on DM and meal planning)  Coordination of Nutrition Care: Continue to monitor while inpatient  Plan of Care discussed with: Daughter    Goals:  Previous Goal Met: Progressing toward Goal(s)  Goals: Meet at least 75% of estimated needs, Teach back diet education, prior to discharge       Nutrition Monitoring and Evaluation:   Behavioral-Environmental Outcomes: None Identified  Food/Nutrient Intake Outcomes: Food and Nutrient Intake  Physical Signs/Symptoms Outcomes: Skin    Discharge Planning:    Recommend pursue outpatient nutrition counseling     Swetha Arcos RD, [facility]  Contact: [phone]

## 2024-06-14 NOTE — PLAN OF CARE
Problem: Nutrition Deficit:  Goal: Optimize nutritional status  Outcome: Not Progressing  Flowsheets (Taken 6/14/2024 1012)  Nutrient intake appropriate for improving, restoring, or maintaining nutritional needs:   Assess nutritional status and recommend course of action   Provide specific nutrition education to patient or family as appropriate   Monitor oral intake, labs, and treatment plans   Recommend appropriate diets, oral nutritional supplements, and vitamin/mineral supplements

## 2024-06-14 NOTE — CARE COORDINATION
Patient discharging home today with home health. DME walker delivered at bedside through [employer]. Home health through [facility], SOC date 06/15/2024.    Transition of Care Plan:    RUR: 8%  Prior Level of Functioning: home  Disposition: home health  If SNF or IPR: Date FOC offered: na  Date FOC received: na  Accepting facility: na  Date authorization started with reference number: na  Date authorization received and expires: na  Follow up appointments:   DME needed: walker  Transportation at discharge: family  IM/IMM Medicare/ letter given: 06/14/2024  Is patient a [place] and connected with [facility]? na  If yes, was  transfer form completed and VA notified? na  Caregiver Contact: at bedside  Discharge Caregiver contacted prior to discharge? At bedside  Care Conference needed? na  Barriers to discharge: na

## 2024-06-14 NOTE — PROGRESS NOTES
Wound care completed and supplied given to patient.    Discharge instructions reviewed with patient and all questions answered. Patient taken downstairs by wheelchair to meet his daughter. No signs or symptoms of distress noted.    DCP: home with HH.    Discharge plan of care/case management plan validated with provider discharge order.

## 2024-06-14 NOTE — DISCHARGE SUMMARY
Hospitalist Discharge Summary     Patient ID:  Marcus Zimmerman  [id number]  89 y.o.  1935  6/7/2024    PCP on record: Joel Bauer MD    Admit date: 6/7/2024  Discharge date and time: 6/14/2024    DISCHARGE DIAGNOSIS:      Diabetic right second toe wound   Type 2 diabetes.  A1c 8.4   Essential hypertension         CONSULTATIONS:  IP CONSULT TO PODIATRY    Excerpted HPI from H&P of Stefan Ibarra MD:  89 y.o. male with multiple medical problems including type 2 diabetes and hypertension who presents to the emergency department accompanied by his daughter for evaluation.  Patient has been in his usual state of health.  He noticed that there was an issue with the wound of the second toe of his right foot.  Apparently according to the history provided by the daughter he was soaking it in some sort of solution.  Unsure of how long it has been there but patient states it has been bothering him for more's for the last 3 days.  He denies any fever, chills, sweats.  No nausea vomiting or diarrhea.  He was otherwise feeling fine with no other specific complaints.  Upon evaluation in the ED, routine lab work was generally unremarkable, there was no leukocytosis.  X-ray of the right foot demonstrates second toe soft tissue distal ulcer without apparent osteomyelitis, fracture, dislocation.          ______________________________________________________________________  DISCHARGE SUMMARY/HOSPITAL COURSE:  for full details see H&P, daily progress notes, labs, consult notes.     89 yr old with DM, HTN, HLD, diabetic food ulcer admitted for right second toe diabetic foot with likely osteomyelitis. S/p  RIGHT SECOND TOE AMPUTATION on 6/13. Initially treated with zosyn and vancomycin. Change to Augmentin course. Follow up with podiatory and complete course of antibiotic. Wound care per instruction.     DM. A1c 8.4. resumed home regime of insulin.     HTN. Resumed Cozaar and HCTZ. Added amlodipine.  clean and dry                          Follow-up with PCP and podiatory in 1 week.  Follow-up tests/labs Check BP, follow up with podiatry.     Follow-up Information       Follow up With Specialties Details Why Contact Joel Tavarez MD Internal Medicine Follow up in 1 week(s)  [address] [facility] [id number] [phone]      Justin Dillon DPM Podiatry Follow up in 1 week(s)  [address] [facility] [id number] [phone]            ________________________________________________________________    Risk of deterioration: Moderate    Condition at Discharge:  Stable  __________________________________________________________________    Disposition  Home with family, no needs    ____________________________________________________________________    Code Status: Full Code  ___________________________________________________________________      Total time in minutes spent coordinating this discharge (includes going over instructions, follow-up, prescriptions, and preparing report for sign off to her PCP) :  35 minutes    Signed:  Rogelio Ventura MD

## 2024-06-17 RX ORDER — IBUPROFEN 600 MG/1
600 TABLET ORAL 3 TIMES DAILY PRN
Qty: 30 TABLET | Refills: 0 | Status: SHIPPED | OUTPATIENT
Start: 2024-06-17

## 2024-06-21 ENCOUNTER — OFFICE VISIT (OUTPATIENT)
Age: 89
End: 2024-06-21
Payer: MEDICARE

## 2024-06-21 VITALS
HEART RATE: 63 BPM | OXYGEN SATURATION: 97 % | BODY MASS INDEX: 36.3 KG/M2 | TEMPERATURE: 98.4 F | HEIGHT: 72 IN | WEIGHT: 268 LBS | DIASTOLIC BLOOD PRESSURE: 79 MMHG | SYSTOLIC BLOOD PRESSURE: 139 MMHG

## 2024-06-21 DIAGNOSIS — E11.9 TYPE 2 DIABETES MELLITUS WITHOUT COMPLICATION, WITH LONG-TERM CURRENT USE OF INSULIN (HCC): ICD-10-CM

## 2024-06-21 DIAGNOSIS — Z98.890 POST-OPERATIVE STATE: Primary | ICD-10-CM

## 2024-06-21 DIAGNOSIS — E11.42 DIABETIC POLYNEUROPATHY ASSOCIATED WITH TYPE 2 DIABETES MELLITUS (HCC): ICD-10-CM

## 2024-06-21 DIAGNOSIS — Z89.421 STATUS POST AMPUTATION OF LESSER TOE OF RIGHT FOOT (HCC): ICD-10-CM

## 2024-06-21 DIAGNOSIS — Z79.4 TYPE 2 DIABETES MELLITUS WITHOUT COMPLICATION, WITH LONG-TERM CURRENT USE OF INSULIN (HCC): ICD-10-CM

## 2024-06-21 DIAGNOSIS — I73.9 PAD (PERIPHERAL ARTERY DISEASE) (HCC): ICD-10-CM

## 2024-06-21 PROCEDURE — 3052F HG A1C>EQUAL 8.0%<EQUAL 9.0%: CPT | Performed by: PODIATRIST

## 2024-06-21 PROCEDURE — 1111F DSCHRG MED/CURRENT MED MERGE: CPT | Performed by: PODIATRIST

## 2024-06-21 PROCEDURE — 1123F ACP DISCUSS/DSCN MKR DOCD: CPT | Performed by: PODIATRIST

## 2024-06-21 PROCEDURE — G8417 CALC BMI ABV UP PARAM F/U: HCPCS | Performed by: PODIATRIST

## 2024-06-21 PROCEDURE — G8427 DOCREV CUR MEDS BY ELIG CLIN: HCPCS | Performed by: PODIATRIST

## 2024-06-21 PROCEDURE — 1036F TOBACCO NON-USER: CPT | Performed by: PODIATRIST

## 2024-06-21 PROCEDURE — 99214 OFFICE O/P EST MOD 30 MIN: CPT | Performed by: PODIATRIST

## 2024-06-21 NOTE — PROGRESS NOTES
Chief Complaint   Patient presents with    Post-Op Check     1 week      /79 (Site: Left Upper Arm, Position: Sitting, Cuff Size: Medium Adult)   Pulse 63   Temp 98.4 °F (36.9 °C) (Temporal)   Ht 1.829 m (6')   Wt 121.6 kg (268 lb)   SpO2 97%   BMI 36.35 kg/m²     1. Have you been to the ER, urgent care clinic since your last visit?  Hospitalized since your last visit?No    2. Have you seen or consulted any other health care providers outside of the [facility] since your last visit?  Include any pap smears or colon screening. No

## 2024-07-03 ENCOUNTER — OFFICE VISIT (OUTPATIENT)
Age: 89
End: 2024-07-03
Payer: MEDICARE

## 2024-07-03 VITALS
DIASTOLIC BLOOD PRESSURE: 62 MMHG | RESPIRATION RATE: 16 BRPM | HEART RATE: 79 BPM | BODY MASS INDEX: 34.92 KG/M2 | SYSTOLIC BLOOD PRESSURE: 142 MMHG | OXYGEN SATURATION: 97 % | WEIGHT: 257.8 LBS | HEIGHT: 72 IN

## 2024-07-03 DIAGNOSIS — E10.621 DIABETIC ULCER OF TOE OF RIGHT FOOT ASSOCIATED WITH TYPE 1 DIABETES MELLITUS, WITH FAT LAYER EXPOSED (HCC): Primary | ICD-10-CM

## 2024-07-03 DIAGNOSIS — Z79.4 TYPE 2 DIABETES MELLITUS WITHOUT COMPLICATION, WITH LONG-TERM CURRENT USE OF INSULIN (HCC): ICD-10-CM

## 2024-07-03 DIAGNOSIS — E11.9 TYPE 2 DIABETES MELLITUS WITHOUT COMPLICATION, WITH LONG-TERM CURRENT USE OF INSULIN (HCC): ICD-10-CM

## 2024-07-03 DIAGNOSIS — I73.9 PAD (PERIPHERAL ARTERY DISEASE) (HCC): ICD-10-CM

## 2024-07-03 DIAGNOSIS — E10.621 DIABETIC ULCER OF TOE OF RIGHT FOOT ASSOCIATED WITH TYPE 1 DIABETES MELLITUS, WITH FAT LAYER EXPOSED (HCC): ICD-10-CM

## 2024-07-03 DIAGNOSIS — L97.512 DIABETIC ULCER OF TOE OF RIGHT FOOT ASSOCIATED WITH TYPE 1 DIABETES MELLITUS, WITH FAT LAYER EXPOSED (HCC): ICD-10-CM

## 2024-07-03 DIAGNOSIS — L97.512 DIABETIC ULCER OF TOE OF RIGHT FOOT ASSOCIATED WITH TYPE 1 DIABETES MELLITUS, WITH FAT LAYER EXPOSED (HCC): Primary | ICD-10-CM

## 2024-07-03 DIAGNOSIS — E11.42 DIABETIC POLYNEUROPATHY ASSOCIATED WITH TYPE 2 DIABETES MELLITUS (HCC): ICD-10-CM

## 2024-07-03 PROCEDURE — 1111F DSCHRG MED/CURRENT MED MERGE: CPT | Performed by: PODIATRIST

## 2024-07-03 PROCEDURE — 1123F ACP DISCUSS/DSCN MKR DOCD: CPT | Performed by: PODIATRIST

## 2024-07-03 PROCEDURE — 1036F TOBACCO NON-USER: CPT | Performed by: PODIATRIST

## 2024-07-03 PROCEDURE — 99214 OFFICE O/P EST MOD 30 MIN: CPT | Performed by: PODIATRIST

## 2024-07-03 PROCEDURE — G8417 CALC BMI ABV UP PARAM F/U: HCPCS | Performed by: PODIATRIST

## 2024-07-03 PROCEDURE — 3052F HG A1C>EQUAL 8.0%<EQUAL 9.0%: CPT | Performed by: PODIATRIST

## 2024-07-03 PROCEDURE — G8427 DOCREV CUR MEDS BY ELIG CLIN: HCPCS | Performed by: PODIATRIST

## 2024-07-03 NOTE — PROGRESS NOTES
Marcus Zimmerman is a 89 y.o. male    Chief Complaint   Patient presents with    Follow-up       BP (!) 142/62   Pulse 79   Resp 16   Ht 1.829 m (6')   Wt 116.9 kg (257 lb 12.8 oz)   SpO2 97%   BMI 34.96 kg/m²         1. Have you been to the ER, urgent care clinic since your last visit?  Hospitalized since your last visit? No    2. Have you seen or consulted any other health care providers outside of the [facility] since your last visit?  Include any pap smears or colon screening. No    Learning Assessment:       No data to display                Fall Risk Assessment:      3/31/2022    10:33 AM 3/25/2022     9:30 AM   Amb Fall Risk Assessment and TUG Test   Fall in past 12 months? 0    Able to walk? Yes    Total Score  2       Abuse Screening:       No data to display                ADL Screening:       No data to display

## 2024-07-06 RX ORDER — LEVOFLOXACIN 500 MG/1
500 TABLET, FILM COATED ORAL DAILY
Qty: 10 TABLET | Refills: 0 | Status: SHIPPED | OUTPATIENT
Start: 2024-07-06 | End: 2024-07-16

## 2024-07-08 ENCOUNTER — OFFICE VISIT (OUTPATIENT)
Age: 89
End: 2024-07-08
Payer: MEDICARE

## 2024-07-08 VITALS
SYSTOLIC BLOOD PRESSURE: 161 MMHG | DIASTOLIC BLOOD PRESSURE: 75 MMHG | RESPIRATION RATE: 20 BRPM | HEIGHT: 72 IN | BODY MASS INDEX: 34.81 KG/M2 | WEIGHT: 257 LBS

## 2024-07-08 DIAGNOSIS — I73.9 PAD (PERIPHERAL ARTERY DISEASE) (HCC): ICD-10-CM

## 2024-07-08 DIAGNOSIS — E10.621 DIABETIC ULCER OF RIGHT MIDFOOT ASSOCIATED WITH TYPE 1 DIABETES MELLITUS, WITH BONE INVOLVEMENT WITHOUT EVIDENCE OF NECROSIS (HCC): Primary | ICD-10-CM

## 2024-07-08 DIAGNOSIS — E10.42 TYPE 1 DIABETES MELLITUS WITH DIABETIC POLYNEUROPATHY (HCC): ICD-10-CM

## 2024-07-08 DIAGNOSIS — L97.416 DIABETIC ULCER OF RIGHT MIDFOOT ASSOCIATED WITH TYPE 1 DIABETES MELLITUS, WITH BONE INVOLVEMENT WITHOUT EVIDENCE OF NECROSIS (HCC): Primary | ICD-10-CM

## 2024-07-08 PROCEDURE — 3052F HG A1C>EQUAL 8.0%<EQUAL 9.0%: CPT | Performed by: PODIATRIST

## 2024-07-08 PROCEDURE — G8417 CALC BMI ABV UP PARAM F/U: HCPCS | Performed by: PODIATRIST

## 2024-07-08 PROCEDURE — 99214 OFFICE O/P EST MOD 30 MIN: CPT | Performed by: PODIATRIST

## 2024-07-08 PROCEDURE — 1123F ACP DISCUSS/DSCN MKR DOCD: CPT | Performed by: PODIATRIST

## 2024-07-08 PROCEDURE — 1111F DSCHRG MED/CURRENT MED MERGE: CPT | Performed by: PODIATRIST

## 2024-07-08 PROCEDURE — 1036F TOBACCO NON-USER: CPT | Performed by: PODIATRIST

## 2024-07-08 PROCEDURE — G8427 DOCREV CUR MEDS BY ELIG CLIN: HCPCS | Performed by: PODIATRIST

## 2024-07-08 ASSESSMENT — PATIENT HEALTH QUESTIONNAIRE - PHQ9
SUM OF ALL RESPONSES TO PHQ9 QUESTIONS 1 & 2: 0
SUM OF ALL RESPONSES TO PHQ QUESTIONS 1-9: 0
1. LITTLE INTEREST OR PLEASURE IN DOING THINGS: NOT AT ALL
2. FEELING DOWN, DEPRESSED OR HOPELESS: NOT AT ALL

## 2024-07-08 NOTE — PROGRESS NOTES
Identified pt with two pt identifiers(name and ). Reviewed record in preparation for visit and have obtained necessary documentation. All patient medications has been reviewed.  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   no       2. Have seen or consulted any other health care provider since your last visit? no        Patient is accompanied by daughter I have received verbal consent from Marcus Zimmerman to discuss any/all medical information while they are present in the room.

## 2024-07-08 NOTE — PROGRESS NOTES
[facility]  [place] PODIATRY & FOOT SURGERY      Subjective:         Patient is a 89 y.o. male who is being seen as a follow up pt for wound evaluation to the right foot. Pt was previously seen as an inpatient at [facility] and underwent a right second toe amputation on 06/13/2024. He was discharged home with oral abx, which he has completed, and with [facility]. Denies any pain, recent trauma or systemic signs of infx. States he has kept his dressing C/D/I. Denies any other LE complaints      Past Medical History:   Diagnosis Date    Burning with urination     Diabetes (HCC)     Hypertension      Past Surgical History:   Procedure Laterality Date    COLONOSCOPY N/A 8/21/2017    COLONOSCOPY performed by Nelson Lange MD at [facility] ENDOSCOPY    NM UNLISTED PROCEDURE ABDOMEN PERITONEUM & OMENTUM      partial colectomy (Dr. Gary Randhawa)    TOE AMPUTATION Right 6/13/2024    RIGHT SECOND TOE AMPUTATION performed by Justin Dillon DPM at [facility] MAIN OR       Family History   Problem Relation Age of Onset    Post-op Nausea/Vomiting Neg Hx     Emergence Delirium Neg Hx     Other Neg Hx     Delayed Awakening Neg Hx     Pseudochol. Deficiency Neg Hx     Malig Hypertherm Neg Hx     Post-op Cognitive Dysfunction Neg Hx       Social History     Tobacco Use    Smoking status: Former    Smokeless tobacco: Never   Substance Use Topics    Alcohol use: No     No Known Allergies  Prior to Admission medications    Medication Sig Start Date End Date Taking? Authorizing Provider   ibuprofen (ADVIL;MOTRIN) 600 MG tablet Take 1 tablet by mouth 3 times daily as needed for Pain 6/17/24  Yes Justin Dillon DPM   amLODIPine (NORVASC) 10 MG tablet Take 1 tablet by mouth daily 6/14/24  Yes Rogelio Ventura MD   brimonidine (ALPHAGAN P) 0.1 % SOLN Place 1 drop into both eyes in the morning and at bedtime   Yes Provider, MD Jessica   dorzolamide-timolol (COSOPT) 2-0.5 % ophthalmic solution Place 1 drop into both

## 2024-07-08 NOTE — PROGRESS NOTES
[facility]  [place] PODIATRY & FOOT SURGERY      Subjective:         Patient is a 89 y.o. male who is being seen as a follow up pt for wound evaluation to the right foot. Pt was previously seen as an inpatient at [facility] and underwent a right second toe amputation on 06/13/2024. He was discharged home with oral abx, which he has completed, and with [facility]. Denies any pain, recent trauma or systemic signs of infx. States he has kept his dressing C/D/I. Denies any other LE complaints       Past Medical History:   Diagnosis Date    Burning with urination     Diabetes (HCC)     Hypertension      Past Surgical History:   Procedure Laterality Date    COLONOSCOPY N/A 8/21/2017    COLONOSCOPY performed by Nelson Lange MD at [facility] ENDOSCOPY    FL UNLISTED PROCEDURE ABDOMEN PERITONEUM & OMENTUM      partial colectomy (Dr. Gary Randhawa)    TOE AMPUTATION Right 6/13/2024    RIGHT SECOND TOE AMPUTATION performed by Justin Dillon DPM at [facility] MAIN OR       Family History   Problem Relation Age of Onset    Post-op Nausea/Vomiting Neg Hx     Emergence Delirium Neg Hx     Other Neg Hx     Delayed Awakening Neg Hx     Pseudochol. Deficiency Neg Hx     Malig Hypertherm Neg Hx     Post-op Cognitive Dysfunction Neg Hx       Social History     Tobacco Use    Smoking status: Former    Smokeless tobacco: Never   Substance Use Topics    Alcohol use: No     No Known Allergies  Prior to Admission medications    Medication Sig Start Date End Date Taking? Authorizing Provider   ibuprofen (ADVIL;MOTRIN) 600 MG tablet Take 1 tablet by mouth 3 times daily as needed for Pain 6/17/24  Yes Justin Dillon DPM   amLODIPine (NORVASC) 10 MG tablet Take 1 tablet by mouth daily 6/14/24  Yes Rogelio Ventura MD   brimonidine (ALPHAGAN P) 0.1 % SOLN Place 1 drop into both eyes in the morning and at bedtime   Yes Provider, MD Jessica   dorzolamide-timolol (COSOPT) 2-0.5 % ophthalmic solution Place 1 drop into both

## 2024-07-10 LAB
BACTERIA SPEC AEROBE CULT: ABNORMAL
BACTERIA SPEC AEROBE CULT: ABNORMAL
BACTERIA SPEC ANAEROBE CULT: ABNORMAL

## 2024-07-15 ENCOUNTER — OFFICE VISIT (OUTPATIENT)
Age: 89
End: 2024-07-15
Payer: MEDICARE

## 2024-07-15 VITALS
HEART RATE: 86 BPM | SYSTOLIC BLOOD PRESSURE: 160 MMHG | DIASTOLIC BLOOD PRESSURE: 73 MMHG | HEIGHT: 72 IN | TEMPERATURE: 98.2 F | OXYGEN SATURATION: 97 % | WEIGHT: 257 LBS | BODY MASS INDEX: 34.81 KG/M2

## 2024-07-15 DIAGNOSIS — E10.42 TYPE 1 DIABETES MELLITUS WITH DIABETIC POLYNEUROPATHY (HCC): ICD-10-CM

## 2024-07-15 DIAGNOSIS — E10.621 DIABETIC ULCER OF RIGHT MIDFOOT ASSOCIATED WITH TYPE 1 DIABETES MELLITUS, WITH BONE INVOLVEMENT WITHOUT EVIDENCE OF NECROSIS (HCC): Primary | ICD-10-CM

## 2024-07-15 DIAGNOSIS — Z89.421 STATUS POST AMPUTATION OF LESSER TOE OF RIGHT FOOT (HCC): ICD-10-CM

## 2024-07-15 DIAGNOSIS — L97.416 DIABETIC ULCER OF RIGHT MIDFOOT ASSOCIATED WITH TYPE 1 DIABETES MELLITUS, WITH BONE INVOLVEMENT WITHOUT EVIDENCE OF NECROSIS (HCC): Primary | ICD-10-CM

## 2024-07-15 DIAGNOSIS — I73.9 PAD (PERIPHERAL ARTERY DISEASE) (HCC): ICD-10-CM

## 2024-07-15 PROCEDURE — 3052F HG A1C>EQUAL 8.0%<EQUAL 9.0%: CPT | Performed by: PODIATRIST

## 2024-07-15 PROCEDURE — 1123F ACP DISCUSS/DSCN MKR DOCD: CPT | Performed by: PODIATRIST

## 2024-07-15 PROCEDURE — 1036F TOBACCO NON-USER: CPT | Performed by: PODIATRIST

## 2024-07-15 PROCEDURE — G8427 DOCREV CUR MEDS BY ELIG CLIN: HCPCS | Performed by: PODIATRIST

## 2024-07-15 PROCEDURE — 99214 OFFICE O/P EST MOD 30 MIN: CPT | Performed by: PODIATRIST

## 2024-07-15 PROCEDURE — G8417 CALC BMI ABV UP PARAM F/U: HCPCS | Performed by: PODIATRIST

## 2024-07-15 RX ORDER — HYDROCHLOROTHIAZIDE 12.5 MG/1
12.5 CAPSULE, GELATIN COATED ORAL EVERY MORNING
COMMUNITY
Start: 2024-07-07

## 2024-07-15 NOTE — PROGRESS NOTES
Chief Complaint   Patient presents with    Follow-up     1 week f/u      BP (!) 160/73 (Site: Left Upper Arm, Position: Sitting, Cuff Size: Medium Adult)   Pulse 86   Temp 98.2 °F (36.8 °C) (Temporal)   Ht 1.829 m (6')   Wt 116.6 kg (257 lb)   SpO2 97%   BMI 34.86 kg/m²     1. Have you been to the ER, urgent care clinic since your last visit?  Hospitalized since your last visit?No    2. Have you seen or consulted any other health care providers outside of the [facility] since your last visit?  Include any pap smears or colon screening. No

## 2024-07-16 PROBLEM — E10.42 TYPE 1 DIABETES MELLITUS WITH DIABETIC POLYNEUROPATHY (HCC): Status: RESOLVED | Noted: 2024-06-13 | Resolved: 2024-07-16

## 2024-07-16 NOTE — PROGRESS NOTES
[facility]  [place] PODIATRY & FOOT SURGERY      Subjective:         Patient is a 89 y.o. male who is being seen as a follow up pt for wound evaluation to the right foot. Pt was previously seen as an inpatient at [facility] and underwent a right second toe amputation on 06/13/2024. He was discharged home with oral abx, which he has completed, and with [facility]. Denies any pain, recent trauma or systemic signs of infx. States he has kept his dressing C/D/I. Denies any other LE complaints      Past Medical History:   Diagnosis Date    Burning with urination     Diabetes (HCC)     Hypertension      Past Surgical History:   Procedure Laterality Date    COLONOSCOPY N/A 8/21/2017    COLONOSCOPY performed by Nelson Lange MD at [facility] ENDOSCOPY    MT UNLISTED PROCEDURE ABDOMEN PERITONEUM & OMENTUM      partial colectomy (Dr. Gary Randhawa)    TOE AMPUTATION Right 6/13/2024    RIGHT SECOND TOE AMPUTATION performed by Justin Dillon DPM at [facility] MAIN OR       Family History   Problem Relation Age of Onset    Post-op Nausea/Vomiting Neg Hx     Emergence Delirium Neg Hx     Other Neg Hx     Delayed Awakening Neg Hx     Pseudochol. Deficiency Neg Hx     Malig Hypertherm Neg Hx     Post-op Cognitive Dysfunction Neg Hx       Social History     Tobacco Use    Smoking status: Former    Smokeless tobacco: Never   Substance Use Topics    Alcohol use: No     No Known Allergies  Prior to Admission medications    Medication Sig Start Date End Date Taking? Authorizing Provider   ibuprofen (ADVIL;MOTRIN) 600 MG tablet Take 1 tablet by mouth 3 times daily as needed for Pain 6/17/24  Yes Justin Dillon DPM   amLODIPine (NORVASC) 10 MG tablet Take 1 tablet by mouth daily 6/14/24  Yes Rogelio Ventura MD   brimonidine (ALPHAGAN P) 0.1 % SOLN Place 1 drop into both eyes in the morning and at bedtime   Yes Provider, MD Jessica   dorzolamide-timolol (COSOPT) 2-0.5 % ophthalmic solution Place 1 drop into both

## 2024-07-17 PROBLEM — E10.42 TYPE 1 DIABETES MELLITUS WITH DIABETIC POLYNEUROPATHY (HCC): Status: ACTIVE | Noted: 2021-03-04

## 2024-07-17 PROBLEM — E11.42 DIABETIC POLYNEUROPATHY (HCC): Status: RESOLVED | Noted: 2019-06-14 | Resolved: 2024-07-17

## 2024-07-17 PROBLEM — L97.416: Status: ACTIVE | Noted: 2024-06-13

## 2024-07-19 NOTE — PROGRESS NOTES
[facility]  [place] PODIATRY & FOOT SURGERY      Subjective:         Patient is a 89 y.o. male who is being seen as a follow up pt for wound evaluation to the right foot. Pt was previously seen as an inpatient at [facility] and underwent a right second toe amputation on 06/13/2024. He was discharged home with oral abx, which he has completed, and with [facility]. States he has cont with the additional oral abx that were rx'ed from this office. Denies any pain, recent trauma or systemic signs of infx. States he has kept his dressing C/D/I. Denies any other LE complaints       Past Medical History:   Diagnosis Date    Burning with urination     Diabetes (HCC)     Hypertension      Past Surgical History:   Procedure Laterality Date    COLONOSCOPY N/A 8/21/2017    COLONOSCOPY performed by Nelson Lange MD at [facility] ENDOSCOPY    DC UNLISTED PROCEDURE ABDOMEN PERITONEUM & OMENTUM      partial colectomy (Dr. Gary Randhawa)    TOE AMPUTATION Right 6/13/2024    RIGHT SECOND TOE AMPUTATION performed by Justin Dillon DPM at [facility] MAIN OR       Family History   Problem Relation Age of Onset    Post-op Nausea/Vomiting Neg Hx     Emergence Delirium Neg Hx     Other Neg Hx     Delayed Awakening Neg Hx     Pseudochol. Deficiency Neg Hx     Malig Hypertherm Neg Hx     Post-op Cognitive Dysfunction Neg Hx       Social History     Tobacco Use    Smoking status: Former    Smokeless tobacco: Never   Substance Use Topics    Alcohol use: No     No Known Allergies  Prior to Admission medications    Medication Sig Start Date End Date Taking? Authorizing Provider   hydroCHLOROthiazide 12.5 MG capsule Take 1 capsule by mouth every morning 7/7/24  Yes Provider, MD Jessica   ibuprofen (ADVIL;MOTRIN) 600 MG tablet Take 1 tablet by mouth 3 times daily as needed for Pain 6/17/24  Yes Justin Dillon DPM   amLODIPine (NORVASC) 10 MG tablet Take 1 tablet by mouth daily 6/14/24  Yes Rogelio Ventura MD   brimonidine

## 2024-07-22 ENCOUNTER — OFFICE VISIT (OUTPATIENT)
Age: 89
End: 2024-07-22
Payer: MEDICARE

## 2024-07-22 VITALS
HEART RATE: 91 BPM | DIASTOLIC BLOOD PRESSURE: 78 MMHG | OXYGEN SATURATION: 99 % | BODY MASS INDEX: 34.81 KG/M2 | WEIGHT: 257 LBS | HEIGHT: 72 IN | SYSTOLIC BLOOD PRESSURE: 151 MMHG | TEMPERATURE: 98.2 F

## 2024-07-22 DIAGNOSIS — E10.42 TYPE 1 DIABETES MELLITUS WITH DIABETIC POLYNEUROPATHY (HCC): ICD-10-CM

## 2024-07-22 DIAGNOSIS — I73.9 PAD (PERIPHERAL ARTERY DISEASE) (HCC): ICD-10-CM

## 2024-07-22 DIAGNOSIS — Z89.421 STATUS POST AMPUTATION OF LESSER TOE OF RIGHT FOOT (HCC): ICD-10-CM

## 2024-07-22 DIAGNOSIS — E10.621 DIABETIC ULCER OF RIGHT MIDFOOT ASSOCIATED WITH TYPE 1 DIABETES MELLITUS, WITH BONE INVOLVEMENT WITHOUT EVIDENCE OF NECROSIS (HCC): Primary | ICD-10-CM

## 2024-07-22 DIAGNOSIS — L97.416 DIABETIC ULCER OF RIGHT MIDFOOT ASSOCIATED WITH TYPE 1 DIABETES MELLITUS, WITH BONE INVOLVEMENT WITHOUT EVIDENCE OF NECROSIS (HCC): Primary | ICD-10-CM

## 2024-07-22 PROCEDURE — 99214 OFFICE O/P EST MOD 30 MIN: CPT | Performed by: PODIATRIST

## 2024-07-22 PROCEDURE — 1036F TOBACCO NON-USER: CPT | Performed by: PODIATRIST

## 2024-07-22 PROCEDURE — G8427 DOCREV CUR MEDS BY ELIG CLIN: HCPCS | Performed by: PODIATRIST

## 2024-07-22 PROCEDURE — 1123F ACP DISCUSS/DSCN MKR DOCD: CPT | Performed by: PODIATRIST

## 2024-07-22 PROCEDURE — G8417 CALC BMI ABV UP PARAM F/U: HCPCS | Performed by: PODIATRIST

## 2024-07-22 PROCEDURE — 3052F HG A1C>EQUAL 8.0%<EQUAL 9.0%: CPT | Performed by: PODIATRIST

## 2024-07-22 NOTE — PROGRESS NOTES
[facility]  [place] PODIATRY & FOOT SURGERY      Subjective:         Patient is a 89 y.o. male who is being seen as a follow up pt for wound evaluation to the right foot. Pt was previously seen as an inpatient at [facility] and underwent a right second toe amputation on 06/13/2024. He was discharged home with oral abx, which he has completed, and with [facility]. States he has completed with the additional oral abx that were rx'ed from this office. Denies any pain, recent trauma or systemic signs of infx. States he has kept his dressing C/D/I. Denies any other LE complaints       Past Medical History:   Diagnosis Date    Burning with urination     Diabetes (HCC)     Hypertension      Past Surgical History:   Procedure Laterality Date    COLONOSCOPY N/A 8/21/2017    COLONOSCOPY performed by Nelson Lange MD at [facility] ENDOSCOPY    [place] UNLISTED PROCEDURE ABDOMEN PERITONEUM & OMENTUM      partial colectomy (Dr. Gary Randhawa)    TOE AMPUTATION Right 6/13/2024    RIGHT SECOND TOE AMPUTATION performed by Justin Dillon DPM at [facility] MAIN OR       Family History   Problem Relation Age of Onset    Post-op Nausea/Vomiting Neg Hx     Emergence Delirium Neg Hx     Other Neg Hx     Delayed Awakening Neg Hx     Pseudochol. Deficiency Neg Hx     Malig Hypertherm Neg Hx     Post-op Cognitive Dysfunction Neg Hx       Social History     Tobacco Use    Smoking status: Former    Smokeless tobacco: Never   Substance Use Topics    Alcohol use: No     No Known Allergies  Prior to Admission medications    Medication Sig Start Date End Date Taking? Authorizing Provider   hydroCHLOROthiazide 12.5 MG capsule Take 1 capsule by mouth every morning 7/7/24   Provider, MD Jessica   ibuprofen (ADVIL;MOTRIN) 600 MG tablet Take 1 tablet by mouth 3 times daily as needed for Pain 6/17/24   Justin Dillon DPM   amLODIPine (NORVASC) 10 MG tablet Take 1 tablet by mouth daily 6/14/24   Rogelio Ventura MD   brimonidine

## 2024-07-22 NOTE — PROGRESS NOTES
Chief Complaint   Patient presents with    Follow-up    Foot Ulcer     BP (!) 151/78 (Site: Left Upper Arm, Position: Sitting, Cuff Size: Large Adult)   Pulse 91   Temp 98.2 °F (36.8 °C) (Temporal)   Ht 1.829 m (6')   Wt 116.6 kg (257 lb)   SpO2 99%   BMI 34.86 kg/m²     1. Have you been to the ER, urgent care clinic since your last visit?  Hospitalized since your last visit?No    2. Have you seen or consulted any other health care providers outside of the [facility] since your last visit?  Include any pap smears or colon screening. No

## 2024-08-16 RX ORDER — DOXYCYCLINE HYCLATE 100 MG
100 TABLET ORAL 2 TIMES DAILY
Qty: 20 TABLET | Refills: 0 | Status: SHIPPED | OUTPATIENT
Start: 2024-08-16 | End: 2024-08-26

## (undated) DEVICE — TUBING, SUCTION, 1/4" X 12', STRAIGHT: Brand: MEDLINE

## (undated) DEVICE — FORCEPS BX L240CM JAW DIA2.8MM L CAP W/ NDL MIC MESH TOOTH

## (undated) DEVICE — SET ADMIN 16ML TBNG L100IN 2 Y INJ SITE IV PIGGY BK DISP

## (undated) DEVICE — PADDING CAST W4INXL4YD ST COT RAYON MICROPLEATED HIGHLY

## (undated) DEVICE — PADDING CAST 4 INX5 YD STRL

## (undated) DEVICE — REM POLYHESIVE ADULT PATIENT RETURN ELECTRODE: Brand: VALLEYLAB

## (undated) DEVICE — SYR 3ML LL TIP 1/10ML GRAD --

## (undated) DEVICE — SNARE ENDOSCP M L240CM W27MM SHTH DIA2.4MM CHN 2.8MM OVL

## (undated) DEVICE — BANDAGE COMPR W6INXL5YD WHT BGE POLY COT M E WRP WV HK AND

## (undated) DEVICE — SPONGE GZ W4XL4IN COT 12 PLY TYP VII WVN C FLD DSGN STERILE

## (undated) DEVICE — SYR 5ML 1/5 GRAD LL NSAF LF --

## (undated) DEVICE — CATH IV AUTOGRD BC BLU 22GA 25 -- INSYTE

## (undated) DEVICE — BASIN EMESIS 500CC ROSE 250/CS 60/PLT: Brand: MEDEGEN MEDICAL PRODUCTS, LLC

## (undated) DEVICE — (D)SYR 10ML 1/5ML GRAD NSAF -- PKGING CHANGE USE ITEM 338027

## (undated) DEVICE — BAG BELONG PT PERS CLEAR HANDL

## (undated) DEVICE — SOLIDIFIER MEDC 1200ML -- CONVERT TO 356117

## (undated) DEVICE — SOLUTION IRRIG 500ML 0.9% SOD CHLO USP POUR PLAS BTL

## (undated) DEVICE — 1010 S-DRAPE TOWEL DRAPE 10/BX: Brand: STERI-DRAPE™

## (undated) DEVICE — CONTAINER SPEC 20 ML LID NEUT BUFF FORMALIN 10 % POLYPR STS

## (undated) DEVICE — BANDAGE COMPR W4INXL5YD WHT BGE POLY COT M E WRP WV HK AND

## (undated) DEVICE — 1200 GUARD II KIT W/5MM TUBE W/O VAC TUBE: Brand: GUARDIAN

## (undated) DEVICE — GARMENT,MEDLINE,DVT,INT,CALF,MED, GEN2: Brand: MEDLINE

## (undated) DEVICE — GLOVE ORANGE PI 7 1/2   MSG9075

## (undated) DEVICE — TRAP SUC MUCOUS 70ML -- MEDICHOICE MEDLINE

## (undated) DEVICE — UPPER EXTREMITY: Brand: MEDLINE INDUSTRIES, INC.

## (undated) DEVICE — NEEDLE SCLERO 23GA L4MM CATH L240CM CNTRST SHTH DIA1.8MM

## (undated) DEVICE — BASIC SINGLE BASIN-LF: Brand: MEDLINE INDUSTRIES, INC.

## (undated) DEVICE — NDL FLTR TIP 5 MIC 18GX1.5IN --

## (undated) DEVICE — GLOVE SURG SZ 8 L12IN FNGR THK79MIL GRN LTX FREE

## (undated) DEVICE — Device

## (undated) DEVICE — DRESSING GZ W3XL16IN CELOS ACETT OIL EMUL N ADH

## (undated) DEVICE — KIT COLON W/ 1.1OZ LUB AND 2 END

## (undated) DEVICE — KENDALL RADIOLUCENT FOAM MONITORING ELECTRODE -RECTANGULAR SHAPE: Brand: KENDALL

## (undated) DEVICE — BAG SPEC BIOHZRD 10 X 10 IN --

## (undated) DEVICE — PREP PAD BNS: Brand: CONVERTORS

## (undated) DEVICE — FCPS BX HOT LG 2.2MMX240CM

## (undated) DEVICE — PENCIL ES CRD L10FT HND SWCHING ROCK SWCH W/ EDGE COAT BLDE

## (undated) DEVICE — NDL PRT INJ NSAF BLNT 18GX1.5 --